# Patient Record
Sex: FEMALE | Race: WHITE | Employment: FULL TIME | URBAN - METROPOLITAN AREA
[De-identification: names, ages, dates, MRNs, and addresses within clinical notes are randomized per-mention and may not be internally consistent; named-entity substitution may affect disease eponyms.]

---

## 2017-09-26 ENCOUNTER — HOSPITAL ENCOUNTER (EMERGENCY)
Facility: CLINIC | Age: 57
Discharge: HOME | End: 2017-09-26
Attending: EMERGENCY MEDICINE | Admitting: EMERGENCY MEDICINE
Payer: COMMERCIAL

## 2017-09-26 VITALS
OXYGEN SATURATION: 98 % | SYSTOLIC BLOOD PRESSURE: 169 MMHG | DIASTOLIC BLOOD PRESSURE: 88 MMHG | HEART RATE: 97 BPM | TEMPERATURE: 97.5 F

## 2017-09-26 DIAGNOSIS — T78.40XA ALLERGIC REACTION, INITIAL ENCOUNTER: ICD-10-CM

## 2017-09-26 DIAGNOSIS — R03.0 ELEVATED BLOOD PRESSURE READING WITHOUT DIAGNOSIS OF HYPERTENSION: ICD-10-CM

## 2017-09-26 PROCEDURE — 99283 EMERGENCY DEPT VISIT LOW MDM: CPT | Performed by: EMERGENCY MEDICINE

## 2017-09-26 PROCEDURE — 99284 EMERGENCY DEPT VISIT MOD MDM: CPT | Mod: Z6 | Performed by: EMERGENCY MEDICINE

## 2017-09-26 ASSESSMENT — ENCOUNTER SYMPTOMS
DIZZINESS: 1
RESPIRATORY NEGATIVE: 1
LIGHT-HEADEDNESS: 1
HEMATOLOGIC/LYMPHATIC NEGATIVE: 1
ENDOCRINE NEGATIVE: 1
EYES NEGATIVE: 1
GASTROINTESTINAL NEGATIVE: 1
NERVOUS/ANXIOUS: 1
CARDIOVASCULAR NEGATIVE: 1
CONSTITUTIONAL NEGATIVE: 1
ALLERGIC/IMMUNOLOGIC NEGATIVE: 1
MUSCULOSKELETAL NEGATIVE: 1

## 2017-09-26 NOTE — ED PROVIDER NOTES
"  History     Chief Complaint   Patient presents with     Allergic Reaction     pt ate almonds 30 mins ago and started feeling shaky, roof of mouth \"felt funny and thick\"  light headed, weak and dizzy  pt took 50 mg benadryl with soom relief.     HPI  Joselyn James is a 56 year old female With a known history of allergies to almonds who presents for concern that she is having an allergic reaction.  Patient ate almonds about 30 minutes prior to ED arrival and began feeling shaky.  She reports the roof of her mouth feels thick and funny and she reports feeling lightheaded weak and dizzy.  She took some Benadryl before coming into the ED. patient reports every time she eats almonds she thinks she might be allergic to this.  She ate 10 almonds prior to coming to the emergency department.  She had a sensation that the roof of her mouth was swelling.  She also felt weak and dizzy.  She was concerned she was having an allergic reaction and elected to come to the emergency department for further care.  She did not have any rash or wheezing she has no swelling of her tongue or facial swelling.  She does not have an EpiPen at home.  She is on sulfasalazine for Crohn's disease and takes omeprazole.  She reports no allergies to medications.  She is yet to be tested for allergies to almonds.    Social history: Lives in [place], [place]. Here in ED by private car with spouse.    Past medical history: Crohn's disease, GERD    Medications:  No current facility-administered medications for this encounter.      Current Outpatient Prescriptions   Medication     sulfaSALAzine (AZULFIDINE) 500 MG tablet     tretinoin (RETIN-A) 0.025 % cream     hydroquinone 4 % CREA     clindamycin-benzoyl peroxide (BENZACLIN) gel     Allergies:     Allergies   Allergen Reactions     Ceftazidime Anaphylaxis     Penicillins Diarrhea     Emesis and difficulty breathing, HIVES     I have reviewed the Medications, Allergies, Past Medical and Surgical " History, and Social History in the Epic system.         Review of Systems   Constitutional: Negative.    HENT:        Sensation of fullness in the throat   Eyes: Negative.    Respiratory: Negative.    Cardiovascular: Negative.    Gastrointestinal: Negative.    Endocrine: Negative.    Genitourinary: Negative.    Musculoskeletal: Negative.    Allergic/Immunologic: Negative.    Neurological: Positive for dizziness and light-headedness.   Hematological: Negative.    Psychiatric/Behavioral: The patient is nervous/anxious.        Physical Exam   BP: (!) 213/110  Pulse: 97  Temp: 97.5  F (36.4  C)  SpO2: 97 %  Physical Exam   Constitutional: She is oriented to person, place, and time. She appears well-developed and well-nourished. No distress.   HENT:   Head: Normocephalic and atraumatic.   Eyes: Conjunctivae and EOM are normal. Pupils are equal, round, and reactive to light. Right eye exhibits no discharge. Left eye exhibits no discharge. No scleral icterus.   Neck: Normal range of motion. Neck supple. No JVD present. No tracheal deviation present. No thyromegaly present.   Cardiovascular: Normal rate and regular rhythm.  Exam reveals no gallop and no friction rub.    No murmur heard.  Pulmonary/Chest: Effort normal and breath sounds normal. No stridor. No respiratory distress. She has no wheezes. She has no rales. She exhibits no tenderness.   Abdominal: Soft. Bowel sounds are normal. She exhibits no distension and no mass. There is no tenderness. There is no rebound and no guarding.   Lymphadenopathy:     She has no cervical adenopathy.   Neurological: She is alert and oriented to person, place, and time.   Skin: No rash noted. She is not diaphoretic. No erythema. No pallor.   Psychiatric: She has a normal mood and affect. Her behavior is normal. Judgment and thought content normal.       ED Course     ED Course     Procedures             Critical Care time:  none               Labs Ordered and Resulted from Time of ED  Arrival Up to the Time of Departure from the ED - No data to display  ED medications: none  .     ED labs and imaging: none    ED Vitals:  Vitals:    09/26/17 0945 09/26/17 1000 09/26/17 1015 09/26/17 1045   BP:  (!) 170/93     Pulse:       Temp:       TempSrc:       SpO2: 98% 98% 98% 98%       Assessments & Plan (with Medical Decision Making)   Clinical impression: Pleasant 56-year-old female with a history of Crohn's disease on sulfasalazine and omeprazole who presented for concern for an allergic reaction.  Her symptoms are not concerning for anaphylaxis or consistent with an allergic reaction, however an allergy to nuts or almonds cannot be excluded without additional testing.    She ate 10 almonds about half an hour prior to ED arrival and felt she had swelling in the back of her throat felt. She reports she felt weak and dizzy.  Patient reports she thinks she might be allergic to almonds.  She has not been tested for allergies to almonds or nuts.  No medication allergies.   She did not have any facial swelling, no tongue swelling, no trouble swallowing..  No wheezing or crackles.  No vomiting.  On my exam she is in no acute distress she is hypertensive.  She reports no history of hypertension.  Her lungs are clear to auscultation.  She has normal speech.  She has no rashes.       ED course and Plan:  She was observed in the emergency department she took oral Benadryl before coming to the ED.  We discussed treatment for allergic reaction and anaphylaxis.  Her presentation is not consistent with anaphylaxis.  Her symptoms are subjective except for hypertension.  We discussed concern for possible untreated hypertension.  We also discussed additional testing in allergy clinic given symptoms occurring after eating almonds.  Patient is discharged home after a period of observation for 2 hours without any progression of her symptoms- she did not develop any oral swelling or lip swelling or tongue swelling or facial  swelling.  She is counseled on untreated hypertension as her blood pressure was elevated during the course of care in the ED.  Blood pressure at time of discharge is 150/97.  I encouraged her to establish primary care.  She may return if she has worsening symptoms.  There is no indication for epinephrine treatment, all indication to send her home with an EpiPen.  She tells me she plans to avoid eating nuts from now on.      Disclaimer: This note consists of symbols derived from keyboarding, dictation and/or voice recognition software. As a result, there may be errors in the script that have gone undetected. Please consider this when interpreting information found in this chart.  I have reviewed the nursing notes.    I have reviewed the findings, diagnosis, plan and need for follow up with the patient.       New Prescriptions    No medications on file       Final diagnoses:   Allergic reaction, initial encounter - Report of sensation of throat tightness, weak and dizzy with increased heart rate, and blood pressure.  No objective sequela of a reaction.   Elevated blood pressure reading without diagnosis of hypertension - Suspicious for untreated hypertension       9/26/2017   [facility] EMERGENCY DEPARTMENT     Ahmet Vogt MD  09/26/17 6484

## 2017-09-26 NOTE — ED NOTES
Ate almonds at 0800 and within 10-15 min had tight throat,lips felt numb and swollen,shakey,weak and dizzy - took benadryl 50 mg with some relief - no problem breathing or swallowing

## 2017-09-26 NOTE — ED AVS SNAPSHOT
[facility] Emergency Department    [address] [facility] [address]    Phone:  [phone]    Fax:  [phone]                                       Joselyn James   MRN: [id number]    Department:  [facility] Emergency Department   Date of Visit:  9/26/2017           Patient Information     Date Of Birth          1960        Your diagnoses for this visit were:     Allergic reaction, initial encounter Report of sensation of throat tightness, weak and dizzy with increased heart rate, and blood pressure.  No objective sequela of a reaction.    Elevated blood pressure reading without diagnosis of hypertension Suspicious for untreated hypertension       You were seen by Ahmet Vogt MD.      Follow-up Information     Follow up with Nitish Vides MD. Call today.    Specialty:  Family Practice    Why:  To establish primary care or he may follow up with Dr. montano if you desire.  It is important to check up and follow-up on your blood pressure.    Contact information:    [facility]  [address]  [place] [address]  [phone]          Follow up with [facility].    Specialty:  Allergy    Why:  If you decide to keep testing for nuts to exclude an allergy    Contact information:    [address] [address] [facility] [address] [phone]    Additional information:    The medical center is located at   [address] [place] [address] (between [address] and   [address] in [place], [place] miles north   of [place]).        Discharge Instructions         General Allergic Reactions  An allergic reaction is a set of symptoms caused by an allergen. An allergen is something that causes a person s immune system to react. When a person comes in contact with an allergen, it causes the body to release chemicals. These include the chemical histamine. Histamine causes swelling and itching. It may affect the entire body. This is called a general allergic reaction. Often  symptoms affect only 1 part of the body. This is called a local allergic reaction.  You are having an allergic reaction. Almost anything can cause one. Different people are allergic to different things. It is usually something that you ate or swallowed, came into contact with by getting or putting it on your skin or clothes, or something you breathed in the air. This can be very annoying and sometimes scary.  Most of us think of allergic reactions when we have a rash or itchy skin. Symptoms can include:    Itching of the eyes, nose, and roof of the mouth    Runny or stuffy nose    Watery eyes     Sneezing or coughing     A blocked feeling in the ear    Red, itchy rash called hives    Red and purple spots    Rash, redness, welts, blisters    Itching, burning, stinging, pain    Dry, flaky, cracking, scaly skin  Severe symptoms include:    Swelling of the face, lips, or other parts of the body    Hoarse voice    Trouble swallowing, feeling like your throat is closing    Trouble breathing, wheezing    Nausea, vomiting, diarrhea, stomach cramps    Feeling faint or lightheaded, rapid heart rate  Sometimes the cause may be obvious. But there are so many things that can cause a reaction that you may not be able to figure out. The most important things to help find your allergen are:    Remembering when it started    What you were doing at the time or just before that    Any activities you were involved in    Any new products or contacts  Below are some common causes. But remember that almost anything can cause a reaction. You may not even be aware that you came into contact with one of these things:    Dust, mold, pollen    Plants (common ones are poison ivy and poison oak, but there are many others)     Animals    Foods such as shrimp, shellfish, peanuts, milk products, gluten, and eggs. Also food colorings, flavorings, and additives.    Insect bites or stings such as bees, mosquitos, fleas, ticks    Medicines such  as penicillin, sulfa medicines, amoxicillin, aspirin, and ibuprofen. But any medicine can cause a reaction.    Jewelry such as nickel or gold. This can be new, or something you ve worn for a while, including zippers and buttons.    Latex such as in gloves, clothes, toys, balloons, or some tapes. Some people allergic to latex may also have problems with foods like bananas, avocados, kiwi, papaya, or chestnuts.    Lotions, perfumes, cosmetics, soaps, shampoos, skincare products, nail products    Chemicals or dyes in clothing, linen, , hair dyes, soaps, iodine  Many viruses and common colds can cause a rash that is not an allergic reaction. Sometimes it is hard to tell the difference between allergies, sensitivity, or an intolerance to something. This is especially true with food. Many things can cause diarrhea, vomiting, stomach cramps, and skin irritation.  Home care    The goal of treatment is to help relieve the symptoms and get you feeling better. The rash will usually fade over several days. But it can sometimes last a couple of weeks. Over the next couple of days, there may be times when it is gets a little worse, and then better again. Here are some things to do:    If you know what you are allergic to, stay away from it. Future reactions could be worse than this one.    Avoid tight clothing and anything that heats up your skin (hot showers or baths, direct sunlight). Heat will make itching worse.    An ice pack will relieve local areas of intense itching and redness. To make an ice pack, put ice cubes in a plastic bag that seals at the top. Wrap it in a thin, clean towel. Don t put the ice directly on the skin because it can damage the skin.    Oral diphenhydramine is an over-the-counter antihistamine sold at pharmacy and grocery stores. Unless a prescription antihistamine was given, diphenhydramine may be used to reduce itching if large areas of the skin are involved. It may make you sleepy. So be  careful using it in the daytime or when going to school, working, or driving. Note: Don t use diphenhydramine if you have glaucoma or if you are a man with trouble urinating due to an enlarged prostate. There are other antihistamines that won t make you so sleepy. These are good choices for daytime use. Ask your pharmacist for suggestions.    Don t use diphenhydramine cream on your skin. It can cause a further reaction in some people.    To help prevent an infection, don't scratch the affected area. Scratching may worsen the reaction and damage your skin. It can also lead to an infection. Always check the affected for signs of an infection.    Call your healthcare provider and ask what you can use to help decrease the itching.    To decrease allergic reactions, try the following:      Use heat-steam to clean your home    Use high-efficiency particulate (HEPA) vacuums and filters    Stay away from food and pet triggers    Kill any cockroaches    Clean your house often  Follow-up care  Follow up with your healthcare provider, or as advised. If you had a severe reaction today, or if you have had several mild to medium allergic reactions in the past, ask your provider about allergy testing. This can help you find out what you are allergic to. If your reaction included dizziness, fainting, or trouble breathing or swallowing, ask your provider about carrying auto-injectable epinephrine.  Call 911  Call 911 if any of these occur:    Trouble breathing or swallowing, wheezing    Cool, moist, pale skin    Shortness of breath    Hoarse voice or trouble speaking    Confused     Very drowsy or trouble awakening    Fainting or loss of consciousness    Rapid heart rate    Feeling of dizziness or weakness or a sudden drop in blood pressure    Feeling of doom    Feeling lightheaded    Severe nausea or vomiting, or diarrhea    Seizure    Swelling in the face, eyelids, lips, mouth, throat or tongue    Drooling  When to seek medical  advice  Call your healthcare provider right away if any of these occur:    Spreading areas of itching, redness or swelling    Nausea or stomach cramps or abdominal pain    Continuing or recurring symptoms    Spreading areas of redness, swelling, or itching    Signs of infection at the affected site:    Spreading redness    Increased pain or swelling    Fluid or colored drainage from the site    Fever of 100.4 F (38 C) or above lasting for 24 to 48 hours, or as directed by your provider  Date Last Reviewed: 3/1/2017    9639-0141 [employer]. [address] [place], [place] [address]. All rights reserved. This information is not intended as a substitute for professional medical care. Always follow your healthcare professional's instructions.          Discharge References/Attachments     HYPERTENSION, TO BE CONFIRMED (ENGLISH)      24 Hour Appointment Hotline       To make an appointment at any [facility], call [phone] ([phone]). If you don't have a family doctor or clinic, we will help you find one. [facility] clinics are conveniently located to serve the needs of you and your family.             Review of your medicines      Our records show that you are taking the medicines listed below. If these are incorrect, please call your family doctor or clinic.        Dose / Directions Last dose taken    clindamycin-benzoyl peroxide gel   Commonly known as:  BENZACLIN   Quantity:  50 g        Apply daily in the morning to face.   Refills:  11        hydroquinone 4 % Crea   Quantity:  45 g        Apply daily to hyperpigmentation on face.   Refills:  4        sulfaSALAzine 500 MG tablet   Commonly known as:  AZULFIDINE   Dose:  1000 mg        Take 1,000 mg by mouth daily   Refills:  0        tretinoin 0.025 % cream   Commonly known as:  RETIN-A   Quantity:  45 g        Spread a pea size amount into affected area topically at bedtime.  Use sunscreen SPF>20.   Refills:  11                Orders  "Needing Specimen Collection     None      Pending Results     No orders found from 2017 to 2017.            Pending Culture Results     No orders found from 2017 to 2017.            Pending Results Instructions     If you had any lab results that were not finalized at the time of your Discharge, you can call the ED Lab Result RN at [phone]. You will be contacted by this team for any positive Lab results or changes in treatment. The nurses are available 7 days a week from 10A to 6:30P.  You can leave a message 24 hours per day and they will return your call.        Test Results From Your Hospital Stay               Thank you for choosing [facility]       Thank you for choosing [facility] for your care. Our goal is always to provide you with excellent care. Hearing back from our patients is one way we can continue to improve our services. Please take a few minutes to complete the written survey that you may receive in the mail after you visit with us. Thank you!        [facility] Information     [email or web address] lets you send messages to your doctor, view your test results, renew your prescriptions, schedule appointments and more. To sign up, go to [email or web address] . Click on \"Log in\" on the left side of the screen, which will take you to the Welcome page. Then click on \"Sign up Now\" on the right side of the page.     You will be asked to enter the access code listed below, as well as some personal information. Please follow the directions to create your username and password.     Your access code is: [email or web address]  Expires: 2017 11:00 AM     Your access code will  in 90 days. If you need help or a new code, please call your [facility] clinic or [phone].        Care EveryWhere ID     This is your Care EveryWhere ID. This could be used by other organizations to access your [facility] medical records  [id number]        Equal Access to Services     NANETTE KRUGER: Marianne tate " Samaria, yue thapa, kahdrafarida kadeepalimiguelina plata, aurelia gibbs. So [facility] [phone].    ATENCIÓN: Si habla español, tiene a price disposición servicios gratuitos de asistencia lingüística. Llame al [phone].    We comply with applicable federal civil rights laws and [place] laws. We do not discriminate on the basis of race, color, national origin, age, disability sex, sexual orientation or gender identity.            After Visit Summary       This is your record. Keep this with you and show to your community pharmacist(s) and doctor(s) at your next visit.

## 2017-09-26 NOTE — DISCHARGE INSTRUCTIONS
General Allergic Reactions  An allergic reaction is a set of symptoms caused by an allergen. An allergen is something that causes a person s immune system to react. When a person comes in contact with an allergen, it causes the body to release chemicals. These include the chemical histamine. Histamine causes swelling and itching. It may affect the entire body. This is called a general allergic reaction. Often symptoms affect only 1 part of the body. This is called a local allergic reaction.  You are having an allergic reaction. Almost anything can cause one. Different people are allergic to different things. It is usually something that you ate or swallowed, came into contact with by getting or putting it on your skin or clothes, or something you breathed in the air. This can be very annoying and sometimes scary.  Most of us think of allergic reactions when we have a rash or itchy skin. Symptoms can include:    Itching of the eyes, nose, and roof of the mouth    Runny or stuffy nose    Watery eyes     Sneezing or coughing     A blocked feeling in the ear    Red, itchy rash called hives    Red and purple spots    Rash, redness, welts, blisters    Itching, burning, stinging, pain    Dry, flaky, cracking, scaly skin  Severe symptoms include:    Swelling of the face, lips, or other parts of the body    Hoarse voice    Trouble swallowing, feeling like your throat is closing    Trouble breathing, wheezing    Nausea, vomiting, diarrhea, stomach cramps    Feeling faint or lightheaded, rapid heart rate  Sometimes the cause may be obvious. But there are so many things that can cause a reaction that you may not be able to figure out. The most important things to help find your allergen are:    Remembering when it started    What you were doing at the time or just before that    Any activities you were involved in    Any new products or contacts  Below are some common causes. But remember that almost anything can cause a  reaction. You may not even be aware that you came into contact with one of these things:    Dust, mold, pollen    Plants (common ones are poison ivy and poison oak, but there are many others)     Animals    Foods such as shrimp, shellfish, peanuts, milk products, gluten, and eggs. Also food colorings, flavorings, and additives.    Insect bites or stings such as bees, mosquitos, fleas, ticks    Medicines such as penicillin, sulfa medicines, amoxicillin, aspirin, and ibuprofen. But any medicine can cause a reaction.    Jewelry such as nickel or gold. This can be new, or something you ve worn for a while, including zippers and buttons.    Latex such as in gloves, clothes, toys, balloons, or some tapes. Some people allergic to latex may also have problems with foods like bananas, avocados, kiwi, papaya, or chestnuts.    Lotions, perfumes, cosmetics, soaps, shampoos, skincare products, nail products    Chemicals or dyes in clothing, linen, , hair dyes, soaps, iodine  Many viruses and common colds can cause a rash that is not an allergic reaction. Sometimes it is hard to tell the difference between allergies, sensitivity, or an intolerance to something. This is especially true with food. Many things can cause diarrhea, vomiting, stomach cramps, and skin irritation.  Home care    The goal of treatment is to help relieve the symptoms and get you feeling better. The rash will usually fade over several days. But it can sometimes last a couple of weeks. Over the next couple of days, there may be times when it is gets a little worse, and then better again. Here are some things to do:    If you know what you are allergic to, stay away from it. Future reactions could be worse than this one.    Avoid tight clothing and anything that heats up your skin (hot showers or baths, direct sunlight). Heat will make itching worse.    An ice pack will relieve local areas of intense itching and redness. To make an ice pack, put ice  cubes in a plastic bag that seals at the top. Wrap it in a thin, clean towel. Don t put the ice directly on the skin because it can damage the skin.    Oral diphenhydramine is an over-the-counter antihistamine sold at pharmacy and grocery stores. Unless a prescription antihistamine was given, diphenhydramine may be used to reduce itching if large areas of the skin are involved. It may make you sleepy. So be careful using it in the daytime or when going to school, working, or driving. Note: Don t use diphenhydramine if you have glaucoma or if you are a man with trouble urinating due to an enlarged prostate. There are other antihistamines that won t make you so sleepy. These are good choices for daytime use. Ask your pharmacist for suggestions.    Don t use diphenhydramine cream on your skin. It can cause a further reaction in some people.    To help prevent an infection, don't scratch the affected area. Scratching may worsen the reaction and damage your skin. It can also lead to an infection. Always check the affected for signs of an infection.    Call your healthcare provider and ask what you can use to help decrease the itching.    To decrease allergic reactions, try the following:      Use heat-steam to clean your home    Use high-efficiency particulate (HEPA) vacuums and filters    Stay away from food and pet triggers    Kill any cockroaches    Clean your house often  Follow-up care  Follow up with your healthcare provider, or as advised. If you had a severe reaction today, or if you have had several mild to medium allergic reactions in the past, ask your provider about allergy testing. This can help you find out what you are allergic to. If your reaction included dizziness, fainting, or trouble breathing or swallowing, ask your provider about carrying auto-injectable epinephrine.  Call 911  Call 911 if any of these occur:    Trouble breathing or swallowing, wheezing    Cool, moist, pale skin    Shortness of  breath    Hoarse voice or trouble speaking    Confused     Very drowsy or trouble awakening    Fainting or loss of consciousness    Rapid heart rate    Feeling of dizziness or weakness or a sudden drop in blood pressure    Feeling of doom    Feeling lightheaded    Severe nausea or vomiting, or diarrhea    Seizure    Swelling in the face, eyelids, lips, mouth, throat or tongue    Drooling  When to seek medical advice  Call your healthcare provider right away if any of these occur:    Spreading areas of itching, redness or swelling    Nausea or stomach cramps or abdominal pain    Continuing or recurring symptoms    Spreading areas of redness, swelling, or itching    Signs of infection at the affected site:    Spreading redness    Increased pain or swelling    Fluid or colored drainage from the site    Fever of 100.4 F (38 C) or above lasting for 24 to 48 hours, or as directed by your provider  Date Last Reviewed: 3/1/2017    3180-7590 [employer]. [address] [place] [address]. All rights reserved. This information is not intended as a substitute for professional medical care. Always follow your healthcare professional's instructions.

## 2017-09-26 NOTE — ED AVS SNAPSHOT
[facility]    [address] [address]    Phone:  [phone]    Fax:  [phone]                                       Joselyn James   MRN: [id number]    Department:  [facility]   Date of Visit:  9/26/2017           After Visit Summary Signature Page     I have received my discharge instructions, and my questions have been answered. I have discussed any challenges I see with this plan with the nurse or doctor.    ..........................................................................................................................................  Patient/Patient Representative Signature      ..........................................................................................................................................  Patient Representative Print Name and Relationship to Patient    ..................................................               ................................................  Date                                            Time    ..........................................................................................................................................  Reviewed by Signature/Title    ...................................................              ..............................................  Date                                                            Time

## 2017-12-27 ENCOUNTER — HOSPITAL ENCOUNTER (EMERGENCY)
Facility: CLINIC | Age: 57
Discharge: HOME | End: 2017-12-27
Attending: EMERGENCY MEDICINE | Admitting: EMERGENCY MEDICINE
Payer: COMMERCIAL

## 2017-12-27 VITALS
OXYGEN SATURATION: 98 % | SYSTOLIC BLOOD PRESSURE: 180 MMHG | HEART RATE: 94 BPM | HEIGHT: 65 IN | TEMPERATURE: 97.6 F | BODY MASS INDEX: 22.49 KG/M2 | DIASTOLIC BLOOD PRESSURE: 104 MMHG | WEIGHT: 135 LBS | RESPIRATION RATE: 16 BRPM

## 2017-12-27 DIAGNOSIS — I10 HYPERTENSION, UNSPECIFIED TYPE: ICD-10-CM

## 2017-12-27 DIAGNOSIS — T78.40XA ALLERGIC REACTION, INITIAL ENCOUNTER: ICD-10-CM

## 2017-12-27 PROCEDURE — 99282 EMERGENCY DEPT VISIT SF MDM: CPT | Performed by: EMERGENCY MEDICINE

## 2017-12-27 PROCEDURE — 99284 EMERGENCY DEPT VISIT MOD MDM: CPT | Mod: Z6 | Performed by: EMERGENCY MEDICINE

## 2017-12-27 NOTE — ED PROVIDER NOTES
History     Chief Complaint   Patient presents with     Allergic Reaction     states was eating some chocolate and feels as though it might have been cross contaminated - states took 3 benadryl 45 min ago - now with high blood pressure that she is concerned about - states feels better although is concerned about bp     HPI  Joselyn James is a 57 year old female who has a history of Crohn's disease, squamous cell carcinoma of skin, hyperlipidemia, peptic ulcer, and GERD who presents to the ED for evaluation of an allergic reaction. Patient states that she has been eating clean and organic foods for many months in an effort to reduce her allergic reactions. Patient reports many allergic reactions in which she will experience a hot flash, increased heart rate, throat tightness, high blood pressure, and occasional hives. These reactions seemed to be well under control since changing her diet. Patient decided to have piece of chocolate today at 15:00, and shortly after this she started to experience all the symptoms of her previous reactions except for hives. Patient took 3 benadryl to alleviate her symptoms, but she continued to feel like she was going to pass out. Patient also continued to have high blood pressure at home which she reports she monitors closely. Patient reports nausea as well.   Patient has a history of hypertension at recent appointments; the most recent episode was in October at which time her blood pressure was 168/92. Patient had normal thyroid tests on 10/23.     Problem List:    There are no active problems to display for this patient.       Past Medical History:    Past Medical History:   Diagnosis Date     Skin cancer        Past Surgical History:    No past surgical history on file.    Family History:    No family history on file.    Social History:  Marital Status:   [2]  Social History   Substance Use Topics     Smoking status: Not on file     Smokeless tobacco: Not on file      "Alcohol use Not on file        Medications:      sulfaSALAzine (AZULFIDINE) 500 MG tablet   tretinoin (RETIN-A) 0.025 % cream   hydroquinone 4 % CREA   clindamycin-benzoyl peroxide (BENZACLIN) gel         Review of Systems  All other systems are reviewed and are negative.    Physical Exam   BP: (!) 195/105  Pulse: 94  Temp: 97.6  F (36.4  C)  Resp: 16  Height: 165.1 cm (5' 5\")  Weight: 61.2 kg (135 lb)  SpO2: 98 %      Physical Exam  Nontoxic appearing no respiratory distress alert and oriented ×3  Head atraumatic normocephalic  Conjunctiva noninjected, oropharynx moist without lesions or erythema  No cervical adenopathy neck supple full active painless range of motion  Lungs clear to auscultation  Heart regular no murmur  Abdomen soft nontender bowel sounds positive no masses or HSM  Strength and sensation grossly intact throughout the extremities, gait and station normal  Speech is fluent, good eye contact, thought processes are rational    ED Course     ED Course     Procedures               Critical Care time:  none               Labs Ordered and Resulted from Time of ED Arrival Up to the Time of Departure from the ED - No data to display  No results found for this or any previous visit (from the past 24 hour(s)).    Medications - No data to display    4:11 PM Patient assessed.    Assessments & Plan (with Medical Decision Making)  57-year-old female presents with \"allergic reaction\".  See HPI for details, pertinent information includes facial flushing, palpitations and tachycardia, elevated blood pressure.  She does at times get hives.  Question if these are actually allergic reactions.  There was no objective evidence for allergy during this visit, and in review of the last visit for allergic reaction again no objective findings to support same.  Question possible pheochromocytoma given flushing, palpitations and hypertension.  Ordered 24-hour urine collection for catecholamine.  Recommend follow-up with " primary care regarding results and further management.  Return criteria reviewed     I have reviewed the nursing notes.    I have reviewed the findings, diagnosis, plan and need for follow up with the patient.       Discharge Medication List as of 12/27/2017  4:47 PM          Final diagnoses:   Hypertension, unspecified type   Allergic reaction, initial encounter     This document serves as a record of the services and decisions personally performed and made by Adis Roe MD. It was created on HIS/HER behalf by   Margarita Poon, a trained medical scribe. The creation of this document is based the provider's statements to the medical scribe.  Margarita Poon 4:11 PM 12/27/2017    Provider:   The information in this document, created by the medical scribe for me, accurately reflects the services I personally performed and the decisions made by me. I have reviewed and approved this document for accuracy prior to leaving the patient care area.  Adis Roe MD 4:11 PM 12/27/2017 12/27/2017   [facility] EMERGENCY DEPARTMENT     Adis Roe MD  12/28/17 162

## 2017-12-27 NOTE — DISCHARGE INSTRUCTIONS
High Blood Pressure, To Be Confirmed, No Treatment  Your blood pressure today was higher than normal. Sometimes anxiety or pain can cause a temporary rise in blood pressure. It later returns to normal. Blood pressure that is high only one time doesn t mean that you have high blood pressure (hypertension). High blood pressure is a chronic illness. But you should have your blood pressure measured again within the next few days to find out if it s still high.    A blood pressure reading is made up of two numbers: a higher number over a lower number. The top number is the systolic pressure. The bottom number is the diastolic pressure. A normal blood pressure is a systolic pressure of less than 120 over a diastolic pressure of less than 80. You will see your blood pressure readings written together. For example, a person with a systolic pressure of 118 and a diastolic pressure of 78 will have 118/78 written in the medical record.     High blood pressure is when either the top number is 140 or higher, or the bottom number is 90 or higher. This must be the result when taking your blood pressure a number of times.   The blood pressures between normal and high are called prehypertension. This is systolic pressure of 120 to 140 or diastolic pressure of 80 to 89. Prehypertension means you are at risk of getting high blood pressure. It's a warning sign. The information gives you a chance to  make lifestyle changes such as weight loss, exercise, and quitting smoking, that can keep your blood pressure from going higher. You should have your blood pressure checked regularly to be sure it isn t rising.  Home care  To track your blood pressure, your provider may ask you to come into the office at different times and on different days. If your healthcare provider asks you to check your readings at home, ask him or her what times of the day to test and for how many days. Before you leave the office, ask your provider to show you how  to take your blood pressure and be sure to ask questions if you don't understand something.  Consider buying an automatic blood pressure monitor. Ask your provider for a recommendation. You can buy blood pressure monitors at most pharmacies.  The American Heart Association recommends the following guidelines for home blood pressure monitoring:    Don't smoke or drink coffee for 30 minutes before taking your blood pressure.    Go to the bathroom before the test.    Relax for 5 minutes before taking the measurement.    Sit with your back supported (don't sit on a couch or soft chair); keep your feet on the floor uncrossed. Place your arm on a solid flat surface (like a table) with the upper part of the arm at heart level. Place the middle of the cuff directly above the eye of the elbow. Check the monitor's instruction manual for an illustration.    Take multiple readings. When you measure, take 2 to 3 readings one minute apart and record all of the results.    Take your blood pressure at the same time every day, or as your healthcare provider recommends.    Record the date, time, and blood pressure reading.    Take the record with you to your next medical appointment. If your blood pressure monitor has a built-in memory, simply take the monitor with you to your next appointment.    Call your provider if you have several high readings. Don't be frightened by a single high blood pressure reading, but if you get several high readings, check in with your healthcare provider.    Note: When blood pressure reaches a systolic (top number) of 180 or higher OR diastolic (bottom number) of 110 or higher, seek emergency medical treatment.  Follow-up care  Keep all of your follow up appointments. If your blood pressure is high (more than 120 over 80) on 2 out of 3 days, you will need to follow up with your healthcare provider for more evaluation and treatment.  Don t put this off! High blood pressure can be treated. High blood  pressure that s not treated raises your risk for heart attack and stroke.  When to seek medical advice  Call your healthcare provider right away if any of these occur:    Blood pressure reaches a systolic (top number) of 180 or higher, OR diastolic (bottom number) of 110 or higher    Chest pain or shortness of breath    Severe headache    Throbbing or rushing sound in the ears    Nosebleed    Sudden severe pain in your belly (abdomen)    Extreme drowsiness, confusion, or fainting    Dizziness or dizziness with spinning sensation (vertigo)    Weakness of an arm or leg or one side of the face    You have problems speaking or seeing   Date Last Reviewed: 12/1/2016 2000-2017 [employer]. [address] [place] [address], [place] [address]. All rights reserved. This information is not intended as a substitute for professional medical care. Always follow your healthcare professional's instructions.      24 hour urine collection as directed, follow-up with primary care as soon as able.  Return here as noted above.  She need to follow blood pressures at home.

## 2017-12-27 NOTE — ED AVS SNAPSHOT
[facility]    [address] [address]    Phone:  [phone]    Fax:  [phone]                                       Joselyn James   MRN: [id number]    Department:  [facility]   Date of Visit:  12/27/2017           After Visit Summary Signature Page     I have received my discharge instructions, and my questions have been answered. I have discussed any challenges I see with this plan with the nurse or doctor.    ..........................................................................................................................................  Patient/Patient Representative Signature      ..........................................................................................................................................  Patient Representative Print Name and Relationship to Patient    ..................................................               ................................................  Date                                            Time    ..........................................................................................................................................  Reviewed by Signature/Title    ...................................................              ..............................................  Date                                                            Time

## 2017-12-27 NOTE — ED NOTES
"Pt has allergy to nuts among other things.  Pt ate chocolate and started to feel an adrenaline rush, ears rang, felt shaky like she was going to pass out, took 75mg benadryl and BP was elevated.  Pt denies any hives and any difficulty swallowing.  Pt currently feels lightheaded and \"out of sort.\"   at bedside.    "

## 2017-12-27 NOTE — ED AVS SNAPSHOT
[facility] Emergency Department    [address] [facility] [address]    Phone:  [phone]    Fax:  [phone]                                       Joselyn James   MRN: [id number]    Department:  [facility] Emergency Department   Date of Visit:  12/27/2017           Patient Information     Date Of Birth          1960        Your diagnoses for this visit were:     Hypertension, unspecified type     Allergic reaction, initial encounter        You were seen by Adis Roe MD.        Discharge Instructions         High Blood Pressure, To Be Confirmed, No Treatment  Your blood pressure today was higher than normal. Sometimes anxiety or pain can cause a temporary rise in blood pressure. It later returns to normal. Blood pressure that is high only one time doesn t mean that you have high blood pressure (hypertension). High blood pressure is a chronic illness. But you should have your blood pressure measured again within the next few days to find out if it s still high.    A blood pressure reading is made up of two numbers: a higher number over a lower number. The top number is the systolic pressure. The bottom number is the diastolic pressure. A normal blood pressure is a systolic pressure of less than 120 over a diastolic pressure of less than 80. You will see your blood pressure readings written together. For example, a person with a systolic pressure of 118 and a diastolic pressure of 78 will have 118/78 written in the medical record.     High blood pressure is when either the top number is 140 or higher, or the bottom number is 90 or higher. This must be the result when taking your blood pressure a number of times.   The blood pressures between normal and high are called prehypertension. This is systolic pressure of 120 to 140 or diastolic pressure of 80 to 89. Prehypertension means you are at risk of getting high blood pressure. It's a warning sign. The information gives you a  chance to  make lifestyle changes such as weight loss, exercise, and quitting smoking, that can keep your blood pressure from going higher. You should have your blood pressure checked regularly to be sure it isn t rising.  Home care  To track your blood pressure, your provider may ask you to come into the office at different times and on different days. If your healthcare provider asks you to check your readings at home, ask him or her what times of the day to test and for how many days. Before you leave the office, ask your provider to show you how to take your blood pressure and be sure to ask questions if you don't understand something.  Consider buying an automatic blood pressure monitor. Ask your provider for a recommendation. You can buy blood pressure monitors at most pharmacies.  The American Heart Association recommends the following guidelines for home blood pressure monitoring:    Don't smoke or drink coffee for 30 minutes before taking your blood pressure.    Go to the bathroom before the test.    Relax for 5 minutes before taking the measurement.    Sit with your back supported (don't sit on a couch or soft chair); keep your feet on the floor uncrossed. Place your arm on a solid flat surface (like a table) with the upper part of the arm at heart level. Place the middle of the cuff directly above the eye of the elbow. Check the monitor's instruction manual for an illustration.    Take multiple readings. When you measure, take 2 to 3 readings one minute apart and record all of the results.    Take your blood pressure at the same time every day, or as your healthcare provider recommends.    Record the date, time, and blood pressure reading.    Take the record with you to your next medical appointment. If your blood pressure monitor has a built-in memory, simply take the monitor with you to your next appointment.    Call your provider if you have several high readings. Don't be frightened by a single high  blood pressure reading, but if you get several high readings, check in with your healthcare provider.    Note: When blood pressure reaches a systolic (top number) of 180 or higher OR diastolic (bottom number) of 110 or higher, seek emergency medical treatment.  Follow-up care  Keep all of your follow up appointments. If your blood pressure is high (more than 120 over 80) on 2 out of 3 days, you will need to follow up with your healthcare provider for more evaluation and treatment.  Don t put this off! High blood pressure can be treated. High blood pressure that s not treated raises your risk for heart attack and stroke.  When to seek medical advice  Call your healthcare provider right away if any of these occur:    Blood pressure reaches a systolic (top number) of 180 or higher, OR diastolic (bottom number) of 110 or higher    Chest pain or shortness of breath    Severe headache    Throbbing or rushing sound in the ears    Nosebleed    Sudden severe pain in your belly (abdomen)    Extreme drowsiness, confusion, or fainting    Dizziness or dizziness with spinning sensation (vertigo)    Weakness of an arm or leg or one side of the face    You have problems speaking or seeing   Date Last Reviewed: 12/1/2016 2000-2017 [employer]. [address] [place] [address]. All rights reserved. This information is not intended as a substitute for professional medical care. Always follow your healthcare professional's instructions.      24 hour urine collection as directed, follow-up with primary care as soon as able.  Return here as noted above.  She need to follow blood pressures at home.    24 Hour Appointment Hotline       To make an appointment at any [facility] clinic, call [phone] ([phone]). If you don't have a family doctor or clinic, we will help you find one. [facility] clinics are conveniently located to serve the needs of you and your family.          ED Discharge Orders      Catecholamines fractioned free urine                    Review of your medicines      Our records show that you are taking the medicines listed below. If these are incorrect, please call your family doctor or clinic.        Dose / Directions Last dose taken    clindamycin-benzoyl peroxide gel   Commonly known as:  BENZACLIN   Quantity:  50 g        Apply daily in the morning to face.   Refills:  11        hydroquinone 4 % Crea   Quantity:  45 g        Apply daily to hyperpigmentation on face.   Refills:  4        sulfaSALAzine 500 MG tablet   Commonly known as:  AZULFIDINE   Dose:  1000 mg        Take 1,000 mg by mouth daily   Refills:  0        tretinoin 0.025 % cream   Commonly known as:  RETIN-A   Quantity:  45 g        Spread a pea size amount into affected area topically at bedtime.  Use sunscreen SPF>20.   Refills:  11                Orders Needing Specimen Collection     None      Pending Results     No orders found from 12/25/2017 to 12/28/2017.            Pending Culture Results     No orders found from 12/25/2017 to 12/28/2017.            Pending Results Instructions     If you had any lab results that were not finalized at the time of your Discharge, you can call the ED Lab Result RN at [phone]. You will be contacted by this team for any positive Lab results or changes in treatment. The nurses are available 7 days a week from 10A to 6:30P.  You can leave a message 24 hours per day and they will return your call.        Test Results From Your Hospital Stay               Thank you for choosing [facility]       Thank you for choosing [facility] for your care. Our goal is always to provide you with excellent care. Hearing back from our patients is one way we can continue to improve our services. Please take a few minutes to complete the written survey that you may receive in the mail after you visit with us. Thank you!        [employer] Information     [employer] lets you send messages to your doctor, view your  "test results, renew your prescriptions, schedule appointments and more. To sign up, go to [email or web address] . Click on \"Log in\" on the left side of the screen, which will take you to the Welcome page. Then click on \"Sign up Now\" on the right side of the page.     You will be asked to enter the access code listed below, as well as some personal information. Please follow the directions to create your username and password.     Your access code is: [email or web address]  Expires: 3/27/2018  4:38 PM     Your access code will  in 90 days. If you need help or a new code, please call your [facility] clinic or [phone].        Care EveryWhere ID     This is your Care EveryWhere ID. This could be used by other organizations to access your [facility] medical records  [id number]        Equal Access to Services     NANETTE ESTRELLA : Hadhenrietta Mera, uye thapa, mary pickeringalmamiguelina plata, aurelia carlton . So [facility] [phone].    ATENCIÓN: Si habla español, tiene a price disposición servicios gratuitos de asistencia lingüística. Llame al [phone].    We comply with applicable federal civil rights laws and [place] laws. We do not discriminate on the basis of race, color, national origin, age, disability, sex, sexual orientation, or gender identity.            After Visit Summary       This is your record. Keep this with you and show to your community pharmacist(s) and doctor(s) at your next visit.                  "

## 2017-12-29 DIAGNOSIS — I10 HYPERTENSION, UNSPECIFIED TYPE: ICD-10-CM

## 2017-12-29 PROCEDURE — 99000 SPECIMEN HANDLING OFFICE-LAB: CPT | Performed by: EMERGENCY MEDICINE

## 2017-12-29 PROCEDURE — 82384 ASSAY THREE CATECHOLAMINES: CPT | Mod: 90 | Performed by: EMERGENCY MEDICINE

## 2018-01-02 ENCOUNTER — OFFICE VISIT - HEALTHEAST (OUTPATIENT)
Dept: FAMILY MEDICINE | Facility: CLINIC | Age: 58
End: 2018-01-02

## 2018-01-02 ENCOUNTER — HOSPITAL ENCOUNTER (OUTPATIENT)
Dept: CT IMAGING | Facility: HOSPITAL | Age: 58
Discharge: HOME | End: 2018-01-02
Attending: FAMILY MEDICINE

## 2018-01-02 DIAGNOSIS — K50.90 CROHN'S DISEASE (H): ICD-10-CM

## 2018-01-02 DIAGNOSIS — C44.92 SQUAMOUS CELL CARCINOMA OF SKIN: ICD-10-CM

## 2018-01-02 DIAGNOSIS — R23.2 FLUSHING: ICD-10-CM

## 2018-01-02 DIAGNOSIS — C44.90 SKIN CANCER: ICD-10-CM

## 2018-01-02 DIAGNOSIS — K27.9 PEPTIC ULCER: ICD-10-CM

## 2018-01-02 ASSESSMENT — MIFFLIN-ST. JEOR: SCORE: 1162.72

## 2018-01-03 ENCOUNTER — RECORDS - HEALTHEAST (OUTPATIENT)
Dept: ADMINISTRATIVE | Facility: OTHER | Age: 58
End: 2018-01-03

## 2018-01-03 ENCOUNTER — COMMUNICATION - HEALTHEAST (OUTPATIENT)
Dept: FAMILY MEDICINE | Facility: CLINIC | Age: 58
End: 2018-01-03

## 2018-01-03 ENCOUNTER — AMBULATORY - HEALTHEAST (OUTPATIENT)
Dept: FAMILY MEDICINE | Facility: CLINIC | Age: 58
End: 2018-01-03

## 2018-01-03 DIAGNOSIS — K86.89 PANCREATIC MASS: ICD-10-CM

## 2018-01-03 DIAGNOSIS — K50.90 CROHN'S DISEASE (H): ICD-10-CM

## 2018-01-04 ENCOUNTER — COMMUNICATION - HEALTHEAST (OUTPATIENT)
Dept: FAMILY MEDICINE | Facility: CLINIC | Age: 58
End: 2018-01-04

## 2018-01-05 ENCOUNTER — HOSPITAL ENCOUNTER (OUTPATIENT)
Dept: MRI IMAGING | Facility: CLINIC | Age: 58
Discharge: HOME | End: 2018-01-05
Attending: FAMILY MEDICINE

## 2018-01-05 DIAGNOSIS — K86.89 PANCREATIC MASS: ICD-10-CM

## 2018-01-05 DIAGNOSIS — K86.9 DISEASE OF PANCREAS, UNSPECIFIED: ICD-10-CM

## 2018-01-10 ENCOUNTER — RECORDS - HEALTHEAST (OUTPATIENT)
Dept: ADMINISTRATIVE | Facility: OTHER | Age: 58
End: 2018-01-10

## 2018-01-10 ENCOUNTER — COMMUNICATION - HEALTHEAST (OUTPATIENT)
Dept: FAMILY MEDICINE | Facility: CLINIC | Age: 58
End: 2018-01-10

## 2018-01-11 ENCOUNTER — TELEPHONE (OUTPATIENT)
Dept: EMERGENCY MEDICINE | Facility: CLINIC | Age: 58
End: 2018-01-11

## 2018-01-11 LAB
CATECHOLS UR-IMP: ABNORMAL
COLLECT DURATION TIME UR: 24 HR
CREAT 24H UR-MRATE: 992 MG/D (ref 500–1400)
CREAT UR-MCNC: 32 MG/DL
DOPAMINE 24H UR-MRATE: 192 UG/D (ref 77–324)
DOPAMINE UR-MCNC: 5 UG/L
DOPAMINE/CREAT UR: 194 UG/G CRT (ref 0–250)
EPINEPH 24H UR-MRATE: 16 UG/D (ref 1–7)
EPINEPH UR-MCNC: 62 UG/L
EPINEPH/CREAT UR: 16 UG/G CRT (ref 0–20)
NOREPINEPH 24H UR-MRATE: 53 UG/D (ref 16–71)
NOREPINEPH UR-MCNC: 17 UG/L
NOREPINEPH/CREAT UR: 53 UG/G CRT (ref 0–45)
SPECIMEN VOL ?TM UR: 3100 ML

## 2018-01-11 NOTE — TELEPHONE ENCOUNTER
[facility] Emergency Department Lab result notification:    [facility] ED lab result protocol used  [facility] labs    Reason for call  Notify of lab results, assess symptoms,  review ED providers recommendations/discharge instructions (if necessary) and advise per ED lab result f/u protocol    Lab Result  Catecholamines fractioned free urine-24 hour  Abnormal results to be called to patient, to be given to her PCP.   Information table from ED Provider visit on 12/27/18  Symptoms reported at ED visit (Chief complaint, HPI) Chief Complaint   Patient presents with     Allergic Reaction       states was eating some chocolate and feels as though it might have been cross contaminated - states took 3 benadryl 45 min ago - now with high blood pressure that she is concerned about - states feels better although is concerned about bp      HPI  Joselyn James is a 57 year old female who has a history of Crohn's disease, squamous cell carcinoma of skin, hyperlipidemia, peptic ulcer, and GERD who presents to the ED for evaluation of an allergic reaction. Patient states that she has been eating clean and organic foods for many months in an effort to reduce her allergic reactions. Patient reports many allergic reactions in which she will experience a hot flash, increased heart rate, throat tightness, high blood pressure, and occasional hives. These reactions seemed to be well under control since changing her diet. Patient decided to have piece of chocolate today at 15:00, and shortly after this she started to experience all the symptoms of her previous reactions except for hives. Patient took 3 benadryl to alleviate her symptoms, but she continued to feel like she was going to pass out. Patient also continued to have high blood pressure at home which she reports she monitors closely. Patient reports nausea as well.   Patient has a history of hypertension at recent appointments; the most recent episode was in October at which time  her blood pressure was 168/92. Patient had normal thyroid tests on 10/23.      ED providers Impression and Plan (applicable information) Final diagnoses:   Hypertension, unspecified type   Allergic reaction, initial encounter      This document serves as a record of the services and decisions personally performed and made by Adis Roe MD. It was created on HIS/HER behalf by   Margarita Poon, a trained medical scribe. The creation of this document is based the provider's statements to the medical scribe.  Margarita Poon 4:11 PM 12/27/2017     Provider:   The information in this document, created by the medical scribe for me, accurately reflects the services I personally performed and the decisions made by me. I have reviewed and approved this document for accuracy prior to leaving the patient care area.  Adis Roe MD 4:11 PM 12/27/2017 12/27/2017   [facility] EMERGENCY DEPARTMENT     Adis Roe MD  12/28/17 8279     Miscellaneous information 24 hour urine sent to lab 12/29/17, completed 1/11/18     RN Assessment (Patient s current Symptoms), include time called.  [Insert Left message here if message left]  4:55 pm Message left to call us back at [phone], between 10 am and 6 pm, seven days a week. May leave a message 24/7, if no one available.     PCP follow-up Questions asked: NO    Odessa Cho RN  [facility] RN  Lung Nodule and ED Lab Result F/u RN  Epic pool (ED late result f/u RN): P [id number]  # [phone]    Copy of Lab result   Component Value Flag Ref Range Units Status Collected Lab   Catecholamines Duration Urine 24   hr Final 12/29/2017  7:00    Catecholamines Volume Urine 3100   mL Final 12/29/2017  7:00    Urine Dopamine/Creatinine 194  0 - 250 ug/g CRT Final 12/29/2017  7:00    Urine Dopamine 192  77 - 324 ug/d Final 12/29/2017  7:00    Comment:   (Note)   REFERENCE INTERVAL: Dopamine, Urine - ug/d   Access complete set of  age- and/or gender-specific   reference intervals for this test in the [facility]   Test Directory ([email or web address]).      Dopamine per Volume Urine 62   ug/L Final 12/29/2017  7:00    Urine Norepineph/Creatinine 53 (H) 0 - 45 ug/g CRT Final 12/29/2017  7:00    Urine Norepinephrine 53  16 - 71 ug/d Final 12/29/2017  7:00    Comment:   (Note)   REFERENCE INTERVAL: Norepinephrine, Urine - ug/d   Access complete set of age- and/or gender-specific   reference intervals for this test in the [facility]   Test Directory ([email or web address]).      Norepinephrine per Volume Urine 17   ug/L Final 12/29/2017  7:00    Urine Epinephrine/Creatinine 16  0 - 20 ug/g CRT Final 12/29/2017  7:00    Urine Epinephrine 16 (H) 1 - 7 ug/d Final 12/29/2017  7:00    Comment:   (Note)   REFERENCE INTERVAL: Epinephrine, Urine - ug/d   Access complete set of age- and/or gender-specific   reference intervals for this test in the [facility]   Test Directory ([email or web address]).      Epinephrine per Volume Urine 5   ug/L Final 12/29/2017  7:00    Catecholamines Fract Urine Free Interp SEE NOTE    Final 12/29/2017  7:00    Comment:   (Note)   TEST INFORMATION: Catecholamines Fractionated, Urine Free   The optimal specimen for this testing is a 24-hour urine   collection. Mass per day calculations are not reported for   patients younger than 4 years of age and for the following   specimen types: a random collection, a collection with   duration of less than 20 hours, a collection with duration   of greater than 28 hours, or a collection with total volume   less than 400 mL (if 18 years of age or older) or greater   than 5000 mL (all ages). Ratios to creatinine may be useful   for these evaluations.   Smaller increases in catecholamine concentrations (less   than two times the upper limit) usually are the result of   physiological stimuli, drugs, or improper specimen   collection. Significant elevation of  one or more   catecholamines (three or more times the upper reference   limit) is associated with an increased probability of a   neuroendocrine tumor.   Access complete set of age- and/or gender-specific   reference intervals for this test in the [facility]   Test Directory ([facility]).   Test developed and characteristics determined by [facility]. See Compliance Statement B: [employer]/CS      Catecholamines Fract Creat Random Urine Free 32   mg/dL Final 12/29/2017  7:00    Catecholamines Fract Creat Timed Urine Free 992  500 - 1400 mg/d Final 12/29/2017  7:00

## 2018-01-19 ENCOUNTER — OFFICE VISIT - HEALTHEAST (OUTPATIENT)
Dept: FAMILY MEDICINE | Facility: CLINIC | Age: 58
End: 2018-01-19

## 2018-01-19 DIAGNOSIS — Z01.818 PREOPERATIVE EXAMINATION: ICD-10-CM

## 2018-01-19 DIAGNOSIS — K86.2 PANCREATIC CYST: ICD-10-CM

## 2018-01-19 DIAGNOSIS — I10 HYPERTENSION: ICD-10-CM

## 2018-01-19 DIAGNOSIS — Z12.11 SCREEN FOR COLON CANCER: ICD-10-CM

## 2018-01-19 DIAGNOSIS — Z12.31 VISIT FOR SCREENING MAMMOGRAM: ICD-10-CM

## 2018-01-19 ASSESSMENT — MIFFLIN-ST. JEOR: SCORE: 1160.79

## 2018-01-23 ENCOUNTER — COMMUNICATION - HEALTHEAST (OUTPATIENT)
Dept: FAMILY MEDICINE | Facility: CLINIC | Age: 58
End: 2018-01-23

## 2018-01-24 ENCOUNTER — COMMUNICATION - HEALTHEAST (OUTPATIENT)
Dept: FAMILY MEDICINE | Facility: CLINIC | Age: 58
End: 2018-01-24

## 2018-01-25 ENCOUNTER — COMMUNICATION - HEALTHEAST (OUTPATIENT)
Dept: FAMILY MEDICINE | Facility: CLINIC | Age: 58
End: 2018-01-25

## 2018-01-25 ENCOUNTER — AMBULATORY - HEALTHEAST (OUTPATIENT)
Dept: NURSING | Facility: CLINIC | Age: 58
End: 2018-01-25

## 2018-01-25 DIAGNOSIS — Z01.818 PRE-OP EXAM: ICD-10-CM

## 2018-01-26 LAB
ATRIAL RATE - MUSE: 67 BPM
DIASTOLIC BLOOD PRESSURE - MUSE: NORMAL MMHG
INTERPRETATION ECG - MUSE: NORMAL
P AXIS - MUSE: 67 DEGREES
PR INTERVAL - MUSE: 150 MS
QRS DURATION - MUSE: 84 MS
QT - MUSE: 428 MS
QTC - MUSE: 452 MS
R AXIS - MUSE: 12 DEGREES
SYSTOLIC BLOOD PRESSURE - MUSE: NORMAL MMHG
T AXIS - MUSE: 31 DEGREES
VENTRICULAR RATE- MUSE: 67 BPM

## 2018-02-07 ASSESSMENT — MIFFLIN-ST. JEOR: SCORE: 1157.62

## 2018-02-08 ENCOUNTER — ANESTHESIA - HEALTHEAST (OUTPATIENT)
Dept: SURGERY | Facility: HOSPITAL | Age: 58
End: 2018-02-08

## 2018-02-09 ENCOUNTER — SURGERY - HEALTHEAST (OUTPATIENT)
Dept: SURGERY | Facility: HOSPITAL | Age: 58
End: 2018-02-09

## 2018-03-26 ENCOUNTER — COMMUNICATION - HEALTHEAST (OUTPATIENT)
Dept: FAMILY MEDICINE | Facility: CLINIC | Age: 58
End: 2018-03-26

## 2018-03-26 DIAGNOSIS — I10 HYPERTENSION: ICD-10-CM

## 2018-04-27 ENCOUNTER — RECORDS - HEALTHEAST (OUTPATIENT)
Dept: ADMINISTRATIVE | Facility: OTHER | Age: 58
End: 2018-04-27

## 2018-08-28 ENCOUNTER — OFFICE VISIT - HEALTHEAST (OUTPATIENT)
Dept: FAMILY MEDICINE | Facility: CLINIC | Age: 58
End: 2018-08-28

## 2018-08-28 DIAGNOSIS — K13.0 CONTACT CHEILITIS: ICD-10-CM

## 2018-08-28 ASSESSMENT — MIFFLIN-ST. JEOR: SCORE: 1175.76

## 2018-10-15 ENCOUNTER — COMMUNICATION - HEALTHEAST (OUTPATIENT)
Dept: FAMILY MEDICINE | Facility: CLINIC | Age: 58
End: 2018-10-15

## 2018-10-19 ENCOUNTER — RECORDS - HEALTHEAST (OUTPATIENT)
Dept: ADMINISTRATIVE | Facility: OTHER | Age: 58
End: 2018-10-19

## 2018-10-26 ENCOUNTER — HOSPITAL ENCOUNTER (OUTPATIENT)
Dept: MRI IMAGING | Facility: HOSPITAL | Age: 58
Discharge: HOME | End: 2018-10-26
Attending: INTERNAL MEDICINE

## 2018-10-26 DIAGNOSIS — K86.2 PANCREAS CYST: ICD-10-CM

## 2018-11-29 ENCOUNTER — OFFICE VISIT - HEALTHEAST (OUTPATIENT)
Dept: FAMILY MEDICINE | Facility: CLINIC | Age: 58
End: 2018-11-29

## 2018-11-29 DIAGNOSIS — K50.90 CROHN'S DISEASE WITHOUT COMPLICATION, UNSPECIFIED GASTROINTESTINAL TRACT LOCATION (H): ICD-10-CM

## 2018-11-29 DIAGNOSIS — C44.92 SQUAMOUS CELL CARCINOMA OF SKIN: ICD-10-CM

## 2018-11-29 DIAGNOSIS — Z12.11 SCREEN FOR COLON CANCER: ICD-10-CM

## 2018-11-29 DIAGNOSIS — I10 HYPERTENSION: ICD-10-CM

## 2018-11-29 DIAGNOSIS — Z00.00 ROUTINE GENERAL MEDICAL EXAMINATION AT A HEALTH CARE FACILITY: ICD-10-CM

## 2018-11-29 LAB
ALBUMIN SERPL-MCNC: 4.2 G/DL (ref 3.5–5)
ALP SERPL-CCNC: 59 U/L (ref 45–120)
ALT SERPL W P-5'-P-CCNC: 19 U/L (ref 0–45)
ANION GAP SERPL CALCULATED.3IONS-SCNC: 8 MMOL/L (ref 5–18)
AST SERPL W P-5'-P-CCNC: 23 U/L (ref 0–40)
BILIRUB DIRECT SERPL-MCNC: 0.2 MG/DL
BILIRUB SERPL-MCNC: 0.5 MG/DL (ref 0–1)
BUN SERPL-MCNC: 12 MG/DL (ref 8–22)
CALCIUM SERPL-MCNC: 9.6 MG/DL (ref 8.5–10.5)
CHLORIDE BLD-SCNC: 104 MMOL/L (ref 98–107)
CHOLEST SERPL-MCNC: 253 MG/DL
CO2 SERPL-SCNC: 31 MMOL/L (ref 22–31)
CREAT SERPL-MCNC: 0.74 MG/DL (ref 0.6–1.1)
ERYTHROCYTE [DISTWIDTH] IN BLOOD BY AUTOMATED COUNT: 10.9 % (ref 11–14.5)
FASTING STATUS PATIENT QL REPORTED: YES
GFR SERPL CREATININE-BSD FRML MDRD: >60 ML/MIN/1.73M2
GLUCOSE BLD-MCNC: 87 MG/DL (ref 70–125)
HCT VFR BLD AUTO: 39.1 % (ref 35–47)
HDLC SERPL-MCNC: 76 MG/DL
HGB BLD-MCNC: 13.3 G/DL (ref 12–16)
LDLC SERPL CALC-MCNC: 160 MG/DL
MCH RBC QN AUTO: 30.1 PG (ref 27–34)
MCHC RBC AUTO-ENTMCNC: 34 G/DL (ref 32–36)
MCV RBC AUTO: 89 FL (ref 80–100)
PLATELET # BLD AUTO: 176 THOU/UL (ref 140–440)
PMV BLD AUTO: 8.6 FL (ref 7–10)
POTASSIUM BLD-SCNC: 4 MMOL/L (ref 3.5–5)
PROT SERPL-MCNC: 6.8 G/DL (ref 6–8)
RBC # BLD AUTO: 4.41 MILL/UL (ref 3.8–5.4)
SODIUM SERPL-SCNC: 143 MMOL/L (ref 136–145)
TRIGL SERPL-MCNC: 84 MG/DL
WBC: 5 THOU/UL (ref 4–11)

## 2018-11-29 ASSESSMENT — MIFFLIN-ST. JEOR: SCORE: 1193.9

## 2019-01-07 RX ORDER — EPINEPHRINE 0.3 MG/.3ML
0.3 INJECTION SUBCUTANEOUS
COMMUNITY
Start: 2017-10-23

## 2019-01-07 RX ORDER — METOPROLOL TARTRATE 25 MG/1
12.5 TABLET, FILM COATED ORAL DAILY
COMMUNITY
End: 2022-01-14

## 2019-01-09 ENCOUNTER — ANESTHESIA EVENT (OUTPATIENT)
Dept: GASTROENTEROLOGY | Facility: CLINIC | Age: 59
End: 2019-01-09
Payer: COMMERCIAL

## 2019-01-09 NOTE — ANESTHESIA PREPROCEDURE EVALUATION
Anesthesia Pre-Procedure Evaluation    Patient: Joselyn James   MRN: [id number] : 1960          Preoperative Diagnosis: screening    Procedure(s):  COLONOSCOPY    Past Medical History:   Diagnosis Date     Skin cancer      No past surgical history on file.    Anesthesia Evaluation     . Pt has had prior anesthetic. Type: MAC and General    History of anesthetic complications   - PONV        ROS/MED HX    ENT/Pulmonary:  - neg pulmonary ROS     Neurologic:  - neg neurologic ROS     Cardiovascular:     (+) Dyslipidemia, hypertension----. : . . . :. .       METS/Exercise Tolerance:     Hematologic:  - neg hematologic  ROS       Musculoskeletal:  - neg musculoskeletal ROS       GI/Hepatic:     (+) GERD Asymptomatic on medication, Other GI/Hepatic crohns disease      Renal/Genitourinary:  - ROS Renal section negative       Endo:  - neg endo ROS       Psychiatric:  - neg psychiatric ROS       Infectious Disease:  - neg infectious disease ROS       Malignancy:      - no malignancy   Other:    - neg other ROS                      Physical Exam  Normal systems: cardiovascular, pulmonary and dental    Airway   Mallampati: II  TM distance: >3 FB  Neck ROM: full    Dental     Cardiovascular       Pulmonary             Lab Results   Component Value Date    WBC 6.3 2015    HGB 14.4 2015    HCT 42.9 2015     2015     2015    POTASSIUM 3.7 2015    CHLORIDE 105 2015    CO2 28 2015    BUN 15 2015    CR 0.81 2015    GLC 95 2015    RENA 9.1 2015    TSH 1.79 2015       Preop Vitals  BP Readings from Last 3 Encounters:   17 (!) 180/104   17 169/88   04/30/15 157/89    Pulse Readings from Last 3 Encounters:   17 94   17 97   04/30/15 79      Resp Readings from Last 3 Encounters:   17 16   04/30/15 16    SpO2 Readings from Last 3 Encounters:   17 98%   17 98%   04/30/15 99%      Temp Readings from  "Last 1 Encounters:   12/27/17 36.4  C (97.6  F) (Oral)    Ht Readings from Last 1 Encounters:   12/27/17 1.651 m (5' 5\")      Wt Readings from Last 1 Encounters:   12/27/17 61.2 kg (135 lb)    Estimated body mass index is 22.47 kg/m  as calculated from the following:    Height as of 12/27/17: 1.651 m (5' 5\").    Weight as of 12/27/17: 61.2 kg (135 lb).       Anesthesia Plan      History & Physical Review  History and physical reviewed and following examination; no interval change.    ASA Status:  2 .    NPO Status:  > 4 hours    Plan for MAC with Intravenous and Propofol induction. Reason for MAC:  Other - see comments  PONV prophylaxis:  Ondansetron (or other 5HT-3) and Dexamethasone or Solumedrol       Postoperative Care  Postoperative pain management:  IV analgesics and Oral pain medications.      Consents  Anesthetic plan, risks, benefits and alternatives discussed with:  Patient..                 KACI Castro CRNA  "

## 2019-01-10 ENCOUNTER — HOSPITAL ENCOUNTER (OUTPATIENT)
Facility: CLINIC | Age: 59
Discharge: HOME | End: 2019-01-10
Attending: SURGERY | Admitting: SURGERY
Payer: COMMERCIAL

## 2019-01-10 ENCOUNTER — RECORDS - HEALTHEAST (OUTPATIENT)
Dept: ADMINISTRATIVE | Facility: OTHER | Age: 59
End: 2019-01-10

## 2019-01-10 ENCOUNTER — ANESTHESIA (OUTPATIENT)
Dept: GASTROENTEROLOGY | Facility: CLINIC | Age: 59
End: 2019-01-10
Payer: COMMERCIAL

## 2019-01-10 VITALS
BODY MASS INDEX: 23.05 KG/M2 | OXYGEN SATURATION: 99 % | WEIGHT: 135 LBS | HEIGHT: 64 IN | TEMPERATURE: 98.2 F | RESPIRATION RATE: 14 BRPM | DIASTOLIC BLOOD PRESSURE: 78 MMHG | SYSTOLIC BLOOD PRESSURE: 123 MMHG | HEART RATE: 75 BPM

## 2019-01-10 LAB — COLONOSCOPY: NORMAL

## 2019-01-10 PROCEDURE — 25000125 ZZHC RX 250: Performed by: SURGERY

## 2019-01-10 PROCEDURE — 88305 TISSUE EXAM BY PATHOLOGIST: CPT | Mod: 26 | Performed by: SURGERY

## 2019-01-10 PROCEDURE — 25000125 ZZHC RX 250: Performed by: NURSE ANESTHETIST, CERTIFIED REGISTERED

## 2019-01-10 PROCEDURE — 25000128 H RX IP 250 OP 636: Performed by: SURGERY

## 2019-01-10 PROCEDURE — 45380 COLONOSCOPY AND BIOPSY: CPT | Performed by: SURGERY

## 2019-01-10 PROCEDURE — 88305 TISSUE EXAM BY PATHOLOGIST: CPT | Performed by: SURGERY

## 2019-01-10 PROCEDURE — 25000128 H RX IP 250 OP 636: Performed by: NURSE ANESTHETIST, CERTIFIED REGISTERED

## 2019-01-10 PROCEDURE — 45380 COLONOSCOPY AND BIOPSY: CPT | Mod: PT | Performed by: SURGERY

## 2019-01-10 PROCEDURE — 37000008 ZZH ANESTHESIA TECHNICAL FEE, 1ST 30 MIN: Performed by: SURGERY

## 2019-01-10 RX ORDER — PROPOFOL 10 MG/ML
INJECTION, EMULSION INTRAVENOUS CONTINUOUS PRN
Status: DISCONTINUED | OUTPATIENT
Start: 2019-01-10 | End: 2019-01-10

## 2019-01-10 RX ORDER — ONDANSETRON 2 MG/ML
4 INJECTION INTRAMUSCULAR; INTRAVENOUS
Status: COMPLETED | OUTPATIENT
Start: 2019-01-10 | End: 2019-01-10

## 2019-01-10 RX ORDER — LIDOCAINE 40 MG/G
CREAM TOPICAL
Status: DISCONTINUED | OUTPATIENT
Start: 2019-01-10 | End: 2019-01-10 | Stop reason: HOSPADM

## 2019-01-10 RX ORDER — SODIUM CHLORIDE, SODIUM LACTATE, POTASSIUM CHLORIDE, CALCIUM CHLORIDE 600; 310; 30; 20 MG/100ML; MG/100ML; MG/100ML; MG/100ML
INJECTION, SOLUTION INTRAVENOUS CONTINUOUS
Status: DISCONTINUED | OUTPATIENT
Start: 2019-01-10 | End: 2019-01-10 | Stop reason: HOSPADM

## 2019-01-10 RX ADMIN — PROPOFOL 200 MCG/KG/MIN: 10 INJECTION, EMULSION INTRAVENOUS at 08:07

## 2019-01-10 RX ADMIN — LIDOCAINE HYDROCHLORIDE 50 MG: 10 INJECTION, SOLUTION EPIDURAL; INFILTRATION; INTRACAUDAL; PERINEURAL at 08:07

## 2019-01-10 RX ADMIN — SODIUM CHLORIDE, POTASSIUM CHLORIDE, SODIUM LACTATE AND CALCIUM CHLORIDE: 600; 310; 30; 20 INJECTION, SOLUTION INTRAVENOUS at 07:10

## 2019-01-10 RX ADMIN — ONDANSETRON 4 MG: 2 INJECTION INTRAMUSCULAR; INTRAVENOUS at 08:07

## 2019-01-10 RX ADMIN — SODIUM CHLORIDE, POTASSIUM CHLORIDE, SODIUM LACTATE AND CALCIUM CHLORIDE: 600; 310; 30; 20 INJECTION, SOLUTION INTRAVENOUS at 07:27

## 2019-01-10 RX ADMIN — LIDOCAINE HYDROCHLORIDE 1 ML: 10 INJECTION, SOLUTION EPIDURAL; INFILTRATION; INTRACAUDAL; PERINEURAL at 07:28

## 2019-01-10 ASSESSMENT — MIFFLIN-ST. JEOR: SCORE: 1182.36

## 2019-01-10 NOTE — ANESTHESIA POSTPROCEDURE EVALUATION
Patient: Joselyn James    Procedure(s):  COMBINED COLONOSCOPY, SINGLE OR MULTIPLE BIOPSY/POLYPECTOMY BY BIOPSY    Diagnosis:screening  Diagnosis Additional Information: No value filed.    Anesthesia Type:  MAC    Note:  Anesthesia Post Evaluation    Patient location during evaluation: Bedside  Patient participation: Able to fully participate in evaluation  Level of consciousness: awake  Pain management: adequate  Airway patency: patent  Cardiovascular status: acceptable  Respiratory status: acceptable  Hydration status: stable  PONV: none     Anesthetic complications: None          Last vitals:  Vitals:    01/10/19 0701   BP: 146/85   Resp: 16   Temp: 36.8  C (98.2  F)   SpO2: 98%         Electronically Signed By: KACI Castro CRNA  January 10, 2019  8:23 AM

## 2019-01-10 NOTE — ANESTHESIA CARE TRANSFER NOTE
Patient: Joselyn James    Procedure(s):  COMBINED COLONOSCOPY, SINGLE OR MULTIPLE BIOPSY/POLYPECTOMY BY BIOPSY    Diagnosis: screening  Diagnosis Additional Information: No value filed.    Anesthesia Type:   MAC     Note:  Airway :Room Air  Patient transferred to:Phase II  Handoff Report: Identifed the Patient, Identified the Reponsible Provider, Reviewed the pertinent medical history, Discussed the surgical course, Reviewed Intra-OP anesthesia mangement and issues during anesthesia, Set expectations for post-procedure period and Allowed opportunity for questions and acknowledgement of understanding      Vitals: (Last set prior to Anesthesia Care Transfer)    CRNA VITALS  1/10/2019 0752 - 1/10/2019 0822      1/10/2019             Pulse:  73    Ht Rate:  73    SpO2:  98 %                Electronically Signed By: KACI Castro CRNA  January 10, 2019  8:22 AM

## 2019-01-10 NOTE — H&P
"58 year old year old female here for colonoscopy for screening.    There is no problem list on file for this patient.      Past Medical History:   Diagnosis Date     Skin cancer        No past surgical history on file.    @[facility]@     current outpatient medications on file.       Allergies   Allergen Reactions     Ceftazidime Anaphylaxis     Azithromycin Hives     Cefaclor Diarrhea     Cephalexin Diarrhea     Ciprofloxacin Other (See Comments)     lethargy     Clarithromycin Nausea     Erythromycin GI Disturbance     Influenza Vaccines Hives     Swelling at site     Penicillins Diarrhea     Emesis and difficulty breathing, HIVES     Pentosan Polysulfate Other (See Comments)     Nuts Other (See Comments) and Rash     Shaky, increased blood pressure, difficulty with breathing, Rash        Pt     Exam:  /85   Temp 98.2  F (36.8  C) (Oral)   Resp 16   Ht 1.626 m (5' 4\")   Wt 61.2 kg (135 lb)   SpO2 98%   Breastfeeding? No   BMI 23.17 kg/m      Awake, Alert OX3  Lungs - CTA bilaterally  CV - RRR, no murmurs, distal pulses intact  Abd - soft, non-distended, non-tender, +BS  Extr - No cyanosis or edema    A/P 58 year old year old female in need of colonoscopy for screening. Risks, benefits, alternatives, and complications were discussed including the possibility of perforation and the patient agreed to proceed    Chon Tracy MD     "

## 2019-01-11 LAB — COPATH REPORT: NORMAL

## 2019-08-26 ENCOUNTER — OFFICE VISIT - HEALTHEAST (OUTPATIENT)
Dept: FAMILY MEDICINE | Facility: CLINIC | Age: 59
End: 2019-08-26

## 2019-08-26 DIAGNOSIS — Z91.018 FOOD ALLERGY: ICD-10-CM

## 2019-08-26 ASSESSMENT — MIFFLIN-ST. JEOR: SCORE: 1221.12

## 2019-09-05 ENCOUNTER — OFFICE VISIT - HEALTHEAST (OUTPATIENT)
Dept: ALLERGY | Facility: CLINIC | Age: 59
End: 2019-09-05

## 2019-09-05 DIAGNOSIS — Z91.018 FOOD ALLERGY: ICD-10-CM

## 2019-09-05 ASSESSMENT — MIFFLIN-ST. JEOR: SCORE: 1212.05

## 2019-10-02 ENCOUNTER — COMMUNICATION - HEALTHEAST (OUTPATIENT)
Dept: FAMILY MEDICINE | Facility: CLINIC | Age: 59
End: 2019-10-02

## 2019-10-02 DIAGNOSIS — K27.9 PEPTIC ULCER: ICD-10-CM

## 2019-10-02 DIAGNOSIS — K50.90 CROHN'S DISEASE WITHOUT COMPLICATION, UNSPECIFIED GASTROINTESTINAL TRACT LOCATION (H): ICD-10-CM

## 2019-10-21 ENCOUNTER — HOSPITAL ENCOUNTER (OUTPATIENT)
Dept: MRI IMAGING | Facility: CLINIC | Age: 59
Discharge: HOME | End: 2019-10-21
Attending: INTERNAL MEDICINE | Admitting: INTERNAL MEDICINE
Payer: COMMERCIAL

## 2019-10-21 DIAGNOSIS — K86.2 PANCREAS CYST: ICD-10-CM

## 2019-10-21 PROCEDURE — 25500064 ZZH RX 255 OP 636: Performed by: RADIOLOGY

## 2019-10-21 PROCEDURE — 74183 MRI ABD W/O CNTR FLWD CNTR: CPT

## 2019-10-21 PROCEDURE — A9585 GADOBUTROL INJECTION: HCPCS | Performed by: RADIOLOGY

## 2019-10-21 RX ORDER — GADOBUTROL 604.72 MG/ML
0.1 INJECTION INTRAVENOUS ONCE
Status: COMPLETED | OUTPATIENT
Start: 2019-10-21 | End: 2019-10-21

## 2019-10-21 RX ADMIN — GADOBUTROL 9 ML: 604.72 INJECTION INTRAVENOUS at 08:40

## 2019-11-03 ENCOUNTER — HEALTH MAINTENANCE LETTER (OUTPATIENT)
Age: 59
End: 2019-11-03

## 2019-12-02 ENCOUNTER — OFFICE VISIT - HEALTHEAST (OUTPATIENT)
Dept: FAMILY MEDICINE | Facility: CLINIC | Age: 59
End: 2019-12-02

## 2019-12-02 DIAGNOSIS — Z00.00 ROUTINE GENERAL MEDICAL EXAMINATION AT A HEALTH CARE FACILITY: ICD-10-CM

## 2019-12-02 DIAGNOSIS — C44.92 SQUAMOUS CELL CARCINOMA OF SKIN: ICD-10-CM

## 2019-12-02 DIAGNOSIS — K27.9 PEPTIC ULCER: ICD-10-CM

## 2019-12-02 DIAGNOSIS — I10 HYPERTENSION: ICD-10-CM

## 2019-12-02 DIAGNOSIS — Z12.31 VISIT FOR SCREENING MAMMOGRAM: ICD-10-CM

## 2019-12-02 DIAGNOSIS — I10 ESSENTIAL HYPERTENSION: ICD-10-CM

## 2019-12-02 DIAGNOSIS — C44.90 SKIN CANCER: ICD-10-CM

## 2019-12-02 DIAGNOSIS — K50.90 CROHN'S DISEASE WITHOUT COMPLICATION, UNSPECIFIED GASTROINTESTINAL TRACT LOCATION (H): ICD-10-CM

## 2019-12-02 DIAGNOSIS — L70.9 ACNE, UNSPECIFIED ACNE TYPE: ICD-10-CM

## 2019-12-02 DIAGNOSIS — E78.5 HYPERLIPIDEMIA, UNSPECIFIED HYPERLIPIDEMIA TYPE: ICD-10-CM

## 2019-12-02 ASSESSMENT — MIFFLIN-ST. JEOR: SCORE: 1226.11

## 2019-12-16 ENCOUNTER — HOSPITAL ENCOUNTER (OUTPATIENT)
Dept: MAMMOGRAPHY | Facility: CLINIC | Age: 59
Discharge: HOME | End: 2019-12-16
Attending: FAMILY MEDICINE | Admitting: FAMILY MEDICINE
Payer: COMMERCIAL

## 2019-12-16 ENCOUNTER — RECORDS - HEALTHEAST (OUTPATIENT)
Dept: ADMINISTRATIVE | Facility: OTHER | Age: 59
End: 2019-12-16

## 2019-12-16 DIAGNOSIS — Z12.31 VISIT FOR SCREENING MAMMOGRAM: ICD-10-CM

## 2019-12-16 PROCEDURE — 77067 SCR MAMMO BI INCL CAD: CPT

## 2020-11-16 ENCOUNTER — HEALTH MAINTENANCE LETTER (OUTPATIENT)
Age: 60
End: 2020-11-16

## 2021-01-04 ENCOUNTER — COMMUNICATION - HEALTHEAST (OUTPATIENT)
Dept: FAMILY MEDICINE | Facility: CLINIC | Age: 61
End: 2021-01-04

## 2021-01-04 DIAGNOSIS — K27.9 PEPTIC ULCER: ICD-10-CM

## 2021-01-18 ENCOUNTER — OFFICE VISIT - HEALTHEAST (OUTPATIENT)
Dept: FAMILY MEDICINE | Facility: CLINIC | Age: 61
End: 2021-01-18

## 2021-01-18 ENCOUNTER — MEDICAL CORRESPONDENCE (OUTPATIENT)
Dept: HEALTH INFORMATION MANAGEMENT | Facility: CLINIC | Age: 61
End: 2021-01-18

## 2021-01-18 ENCOUNTER — COMMUNICATION - HEALTHEAST (OUTPATIENT)
Dept: FAMILY MEDICINE | Facility: CLINIC | Age: 61
End: 2021-01-18

## 2021-01-18 DIAGNOSIS — I10 HYPERTENSION: ICD-10-CM

## 2021-01-18 DIAGNOSIS — C44.90 SKIN CANCER: ICD-10-CM

## 2021-01-18 DIAGNOSIS — Z87.19 HISTORY OF GASTRITIS: ICD-10-CM

## 2021-01-18 DIAGNOSIS — J02.9 PHARYNGITIS, UNSPECIFIED ETIOLOGY: ICD-10-CM

## 2021-01-18 DIAGNOSIS — L70.9 ACNE, UNSPECIFIED ACNE TYPE: ICD-10-CM

## 2021-01-18 DIAGNOSIS — K50.90 CROHN'S DISEASE WITHOUT COMPLICATION, UNSPECIFIED GASTROINTESTINAL TRACT LOCATION (H): ICD-10-CM

## 2021-01-18 DIAGNOSIS — E78.5 HYPERLIPIDEMIA, UNSPECIFIED HYPERLIPIDEMIA TYPE: ICD-10-CM

## 2021-01-19 ENCOUNTER — MEDICAL CORRESPONDENCE (OUTPATIENT)
Dept: HEALTH INFORMATION MANAGEMENT | Facility: CLINIC | Age: 61
End: 2021-01-19

## 2021-01-19 DIAGNOSIS — I10 ESSENTIAL HYPERTENSION, MALIGNANT: Primary | ICD-10-CM

## 2021-01-19 DIAGNOSIS — E78.5 HYPERLIPEMIA: ICD-10-CM

## 2021-05-31 VITALS — HEIGHT: 65 IN | BODY MASS INDEX: 21.77 KG/M2 | WEIGHT: 130.7 LBS

## 2021-05-31 VITALS — HEIGHT: 65 IN | BODY MASS INDEX: 21.85 KG/M2 | WEIGHT: 131.13 LBS

## 2021-05-31 VITALS — HEIGHT: 65 IN | BODY MASS INDEX: 21.66 KG/M2 | WEIGHT: 130 LBS

## 2021-05-31 NOTE — PROGRESS NOTES
Assessment/ Plan     1. Food allergy concern  Patient has had a several year history of discrete episodes consisting of shaking, flushing, and nausea following eating.  She has been worked up in the past by GI and had a normal abdominal CT except for a pancreatic cyst that they are watching.  She has had blood work to rule out pheochromocytoma.  She feels like this is related to something that she is eating.  She just peanuts and tree nuts as a trigger.  She also thinks possibly MSG, fructose and wheat might be triggers.  These episodes are not classic for a food allergy reaction, however, it might warrant a visit with 1 of our allergist to discuss.  Unfortunately, she is currently being treated with supplements by her chiropractor at the cost of $500 per month and 27 pills/day.  She can no longer afford to do this and I feel it is likely unnecessary.  She states that she does get some relief when she takes Benadryl of her symptoms.  - Ambulatory referral to Allergy      Subjective:       Joselyn James is a 58 y.o. female who presents for discussion of possible food allergy.  This patient normally sees 1 of my partners, Dr. Nitish Alvarado.  She states that she has been having these episodes or reactions that she calls them for the last couple of years.  She states they always follow eating something.  She states that she will start to shake and feel nauseated.  Oftentimes her blood pressure will elevate.  She will feel really weak.  Her ears will turn red and she will feel dizzy and flushed.  She states often times there is a rash.  She shows me a photo of her legs which looks like reticularis livedo, but difficult to say for sure from the photo.  She states that definitely she has noticed if she eats peanuts or almonds she will get this reaction.  She never has a sensation of her lips or tongue swelling.  She does not have diarrhea from this.  She does not get any respiratory symptoms or hives.  She states that  "Benadryl can often help when she has the symptoms.  She has had several visits to the emergency room.  She was worked up for pheochromocytoma and that was normal.  She had abdominal CT which showed a cyst in the pancreas which they are following and think is likely benign.  She follows with GI as she has a history of Crohn's disease.  They did not really give her any ideas of what they thought these episodes may be related to.  She is currently seeing a chiropractor who is treating her for \"leaky gut\" and \"adrenal insufficiency\".  She is currently taking 27 supplements a day and this is costing her $500 a month.  She states she can no longer afford to do this.  She states he had her on an elimination diet.  Initially she was just eating chicken broth and then slowly reintroduced mainly [employer].  She has noticed when she has had MSG this will cause a reaction and she is also noticed high fructose corn syrup and wheat will also trigger an episode.  She does feel that Benadryl will help the symptoms.  She has never seen an allergist but would like to.    Relevant past medical, family, surgical, and social history reviewed with patient, unless noted in HPI, not pertinent for this visit.  Medications were discussed and reconciled.   Review of Systems   A 12 point comprehensive review of systems was negative except as noted.      Current Outpatient Medications   Medication Sig Dispense Refill     EPINEPHrine (EPIPEN) 0.3 mg/0.3 mL atIn Inject 0.3 mg into the shoulder, thigh, or buttocks as needed.        metoprolol succinate (TOPROL-XL) 25 MG Take 1 tablet (25 mg total) by mouth daily. 90 tablet 3     omeprazole (PRILOSEC) 20 MG capsule Take 1 capsule (20 mg total) by mouth daily before breakfast. 90 capsule 3     sulfaSALAzine (AZULFIDINE) 500 mg tablet Take 2 tablets (1,000 mg total) by mouth 2 (two) times a day. 360 tablet 2     No current facility-administered medications for this visit.        Objective:      BP " "126/72   Pulse 68   Temp 98.3  F (36.8  C)   Resp 16   Ht 5' 4.5\" (1.638 m)   Wt 144 lb (65.3 kg)   BMI 24.34 kg/m        General appearance: alert, appears stated age and cooperative  Head: Normocephalic, without obvious abnormality, atraumatic  Eyes: conjunctivae/corneas clear.   Ears: normal TM's and external ear canals both ears  Nose: Nares normal. Septum midline. Mucosa normal. No drainage or sinus tenderness.  Throat: lips, mucosa, and tongue normal; teeth and gums normal  Neck: no adenopathy  Lungs: clear to auscultation bilaterally  Heart: regular rate and rhythm, S1, S2 normal, no murmur, click, rub or gallop  Abdomen: soft, non-tender; bowel sounds normal; no masses,  no organomegaly  Extremities: extremities normal, atraumatic, no cyanosis or edema  Pulses: 2+ and symmetric      No results found for this or any previous visit (from the past 168 hour(s)).       This note has been dictated using voice recognition software. Any grammatical or context distortions are unintentional and inherent to the software  "

## 2021-06-01 VITALS — BODY MASS INDEX: 22.33 KG/M2 | HEIGHT: 65 IN | WEIGHT: 134 LBS

## 2021-06-01 NOTE — PROGRESS NOTES
Assessment:    Symptoms most consistent with food intolerance/sensitivity.  Identified foods of MSG, fructose, wheat, Allmond.  Symptoms are not consistent with classic IgE mediated food allergy    Plan:    Food avoidance as tolerated.  Foods can safely be eaten.  No EpiPen is needed.  Elimination diets can be done if additional foods are identified.  Recommend 6 weeks off followed by reintroduction for 2 weeks.  ____________________________________________________________________________     Patient comes in today for concerns about allergic reactions to food.  She is noticed this over the past 2 years.  She had different reactions to different foods.  She notes with Allmond she had nasal congestion and sneezing and then throat tightness.  She did use Benadryl.  Unclear whether this was beneficial.  She also identifies foods like MSG fructose and aspartame is causing shaky sensation, head rush sensation, flushing.  She is identified wheat is causing stomach upset and flushing symptoms.  She does have contact rashes with lip balm, Band-Aids and latex.  Patient was seen in September 2017 in the emergency department with a possible food allergic reaction.  I did review those records.  She had a blood pressure 154/97.  No objective symptoms of food allergy.  No hives.  At that time she had had some Allmond.  Again the patient was seen in emergency room in December 2017 for concern of possible food allergic reaction.  At that time had some chocolate which she was concerned there might of been not contamination.  Had hypertension.  She had a blood pressure of 180/104.  24-hour urine catecholamines were obtained which were negative.  She does have a history of hypertension outside of these episodes.  No description of urticaria or angioedema.  No wheezing or shortness of breath.  No hypotension.    Review of symptoms:  As above, otherwise negative    Past medical history: Crohn's disease.  History of colon resection.   History of cholecystectomy and hysterectomy.  No other chronic medical conditions noted.    Allergies: No known allergies hymenoptera venom.  History of multiple drug allergies including ceftazidime, azithromycin, Ceclor, cephalexin, ciprofloxacin, clarithromycin, erythromycin, flu vaccine, heparinoids, pentosan polysulfate sodium, penicillins.    Family history: Brother with asthma.  Multiple members of the family with allergies.    Social history: Currently lives at home that has forced air heat and central air conditioning.  There is a basement present.  No pets in the home.  No current cigarette smoking.    Medications: reviewed in chart    Physical Exam:  General:  Alert and in no apparent distress.  Eyes:  Sclera clear.  Ears: TMs translucent grey with bony landmarks visible. Nose: Pale, boggy mucosal membranes.  Throat: Pink, moist.  No lesions.  Neck: Supple.  No lymphadenopathy.  Lungs: CTA.  CV: Regular rate and rhythm. Extremities: Well perfused.  No clubbing or cyanosis. Skin: No rash    Last Food Skin Allergy Test Results  Major Allergens  Wheat  1:20 (W/F in mm): 0/0 (09/05/19 1542)  Tree Nuts  Camilla  1:20 (W/F in mm): 0/0 (09/05/19 1542)  Controls  Device Type: QUINTIP (09/05/19 1542)  Neg. Control: 50% Glycerine-Saline H (W/F in millimeters): 0/0 (09/05/19 1542)  Pos. Control Histamine 6 mg/ml (W/F in millimeters): 5/F (09/05/19 1542)    45 min spent in direct contact with the patient apart from testing.  More than 50% in counseling regarding food allergy and food intolerance.  Discussed management of food intolerance and elimination diet.

## 2021-06-01 NOTE — PATIENT INSTRUCTIONS - HE
Assessment:    Symptoms most consistent with food intolerance/sensitivity.  Identified foods of MSG, fructose, wheat, Allmond.  Symptoms are not consistent with classic IgE mediated food allergy    Plan:    Food avoidance as tolerated.  Foods can safely be eaten.  No EpiPen is needed.  Elimination diets can be done if additional foods are identified.  Recommend 6 weeks off followed by reintroduction for 2 weeks.

## 2021-06-02 VITALS — WEIGHT: 138 LBS | HEIGHT: 65 IN | BODY MASS INDEX: 22.99 KG/M2

## 2021-06-02 NOTE — TELEPHONE ENCOUNTER
RN cannot approve Refill Request    RN can NOT refill this medication med is not covered by policy/route to provider     Marybel Delgado, [facility] Connection Triage/Med Refill 10/3/2019    Requested Prescriptions   Pending Prescriptions Disp Refills     sulfaSALAzine (AZULFIDINE) 500 mg tablet [Pharmacy Med Name: SULFASALAZINE 500MG TABS] 360 tablet 0     Sig: TAKE TWO TABLETS BY MOUTH TWICE A DAY       There is no refill protocol information for this order      Signed Prescriptions Disp Refills    omeprazole (PRILOSEC) 20 MG capsule 90 capsule 3     Sig: TAKE ONE CAPSULE BY MOUTH EVERY DAY BEFORE BREAKFAST       GI Medications Refill Protocol Passed - 10/2/2019  3:14 PM        Passed - PCP or prescribing provider visit in last 12 or next 3 months.     Last office visit with prescriber/PCP: 1/2/2018 Nitish Alvarado MD OR same dept: 8/26/2019 Kimberlee Torres MD OR same specialty: 8/26/2019 Kimberlee Torres MD  Last physical: 11/29/2018 Last MTM visit: Visit date not found   Next visit within 3 mo: Visit date not found  Next physical within 3 mo: Visit date not found  Prescriber OR PCP: Nitish Alvarado MD  Last diagnosis associated with med order: 1. Peptic ulcer  - omeprazole (PRILOSEC) 20 MG capsule; TAKE ONE CAPSULE BY MOUTH EVERY DAY BEFORE BREAKFAST  Dispense: 90 capsule; Refill: 3    If protocol passes may refill for 12 months if within 3 months of last provider visit (or a total of 15 months).

## 2021-06-02 NOTE — TELEPHONE ENCOUNTER
Refill Approved    Rx renewed per Medication Renewal Policy. Medication was last renewed on 10/15/18.    Marybel Delgado, [facility] Triage/Med Refill 10/3/2019     Requested Prescriptions   Pending Prescriptions Disp Refills     omeprazole (PRILOSEC) 20 MG capsule [Pharmacy Med Name: OMEPRAZOLE 20MG CPDR] 90 capsule 3     Sig: TAKE ONE CAPSULE BY MOUTH EVERY DAY BEFORE BREAKFAST       GI Medications Refill Protocol Passed - 10/2/2019  3:14 PM        Passed - PCP or prescribing provider visit in last 12 or next 3 months.     Last office visit with prescriber/PCP: 1/2/2018 Nitish Alvarado MD OR same dept: 8/26/2019 Kimberlee Torres MD OR same specialty: 8/26/2019 Kimberlee Torres MD  Last physical: 11/29/2018 Last MTM visit: Visit date not found   Next visit within 3 mo: Visit date not found  Next physical within 3 mo: Visit date not found  Prescriber OR PCP: Nitish Alvarado MD  Last diagnosis associated with med order: There are no diagnoses linked to this encounter.  If protocol passes may refill for 12 months if within 3 months of last provider visit (or a total of 15 months).             sulfaSALAzine (AZULFIDINE) 500 mg tablet [Pharmacy Med Name: SULFASALAZINE 500MG TABS] 360 tablet 0     Sig: TAKE TWO TABLETS BY MOUTH TWICE A DAY       There is no refill protocol information for this order

## 2021-06-02 NOTE — TELEPHONE ENCOUNTER
Pt states that she sees GI 1x per year but they do not fill any meds, historically her Family MD has filled this. If approp can PMD fill? Pt also states that she plans to schedule a CPE soon. Thanks.

## 2021-06-02 NOTE — TELEPHONE ENCOUNTER
Can you please check with patient.  She is on sulfasalazine and the refill came to me.  Typically, this is managed by gastroenterology.  Can you please clarify whether she is still seeing gastroenterology as this should go to them? Thanks.

## 2021-06-03 VITALS
HEART RATE: 84 BPM | SYSTOLIC BLOOD PRESSURE: 148 MMHG | DIASTOLIC BLOOD PRESSURE: 82 MMHG | BODY MASS INDEX: 23.66 KG/M2 | HEIGHT: 65 IN | WEIGHT: 142 LBS

## 2021-06-03 VITALS — HEIGHT: 65 IN | WEIGHT: 144 LBS | BODY MASS INDEX: 23.99 KG/M2

## 2021-06-03 NOTE — PATIENT INSTRUCTIONS - HE
Remain active  Monitor your blood pressure. Continue metoprolol. This may need to be adjusted  Set up your mammogram  Remember adequate calcium 1200 mg plus vitamin D 1,000 units daily

## 2021-06-04 VITALS
HEIGHT: 65 IN | HEART RATE: 76 BPM | WEIGHT: 145.1 LBS | BODY MASS INDEX: 24.18 KG/M2 | SYSTOLIC BLOOD PRESSURE: 170 MMHG | DIASTOLIC BLOOD PRESSURE: 92 MMHG

## 2021-06-04 NOTE — PROGRESS NOTES
Assessment/ Plan     1. Routine general medical examination at a health care facility    Recommend adequate calcium 1200 mg plus vitamin D 1000 units daily  Recommend weightbearing exercise as tolerated  Refer for a mammogram  She continues to follow-up with gastroenterology is up-to-date on her colonoscopy    2. Visit for screening mammogram    Refer for mammogram  - Mammo Screening Bilateral; Future    3. Crohn's disease without complication, unspecified gastrointestinal tract location (H)  Continue sulfasalazine    Follow-up with gastroenterology  - sulfaSALAzine (AZULFIDINE) 500 mg tablet; Take 2 tablets (1,000 mg total) by mouth 2 (two) times a day.  Dispense: 360 tablet; Refill: 3    4. Essential hypertension    Currently stable  Continue metoprolol  I reviewed side effects.  Her preference is to stay on this medication and not to switch to other first-line medications such as lisinopril, hydrochlorothiazide, or a calcium channel blocker  - HM2(CBC w/o Differential); Future  - Basic Metabolic Panel; Future    5. Squamous cell carcinoma of skin  6. Skin cancer    Continue to follow-up with dermatology    7. Hyperlipidemia, unspecified hyperlipidemia type    She will check a lipid cascade in the future  Calculate the 10-year cardiovascular risk  - Hepatic Profile; Future  - Lipid Cascade; Future    8. Acne, unspecified acne type    Refilled clindamycin  - clindamycin (CLINDAGEL) 1 % gel; Apply topically 2 (two) times a day.  Dispense: 60 g; Refill: 6    9.  History of peptic ulcer    Treat with omeprazole  - omeprazole (PRILOSEC) 20 MG capsule; TAKE ONE CAPSULE BY MOUTH EVERY DAY BEFORE BREAKFAST  Dispense: 90 capsule; Refill: 3        Subjective:       Joselyn James is a 59 y.o. female who presents for a complete physical examination.  Her medical history is notable for Crohn's colitis with a previous partial colectomy.  She continues to follow-up with gastroenterology and is treated with sulfasalazine.  She  "also has had an evaluation for a cystic lesion in the pancreas of uncertain significance.  She had an MRI as directed by gastroenterology and this showed cystic lesions.  There was concern mentioned about possible IPMN and this will be monitored.  Her Crohn's disease has been stable.    She has had a previous hysterectomy.  She continues to follow-up with her OB/GYN.    She does have a history of hypertension which is treated with metoprolol currently.  She continues to take omeprazole for reflux symptoms.    Overall, she has been feeling well.  She denies concerns with chest pain, shortness of breath, palpitations, or urinary concerns.    She continues to follow-up with dermatology given a history of skin cancer.      The following portions of the patient's history were reviewed and updated as appropriate: allergies, current medications, past family history, past medical history, past social history, past surgical history and problem list. Medications have been reconciled    Review of Systems   A 12 point comprehensive review of systems was negative except as noted.      Current Outpatient Medications   Medication Sig Dispense Refill     metoprolol succinate (TOPROL-XL) 25 MG Take 1 tablet (25 mg total) by mouth daily. 90 tablet 3     omeprazole (PRILOSEC) 20 MG capsule TAKE ONE CAPSULE BY MOUTH EVERY DAY BEFORE BREAKFAST 90 capsule 3     sulfaSALAzine (AZULFIDINE) 500 mg tablet Take 2 tablets (1,000 mg total) by mouth 2 (two) times a day. 360 tablet 3     clindamycin (CLINDAGEL) 1 % gel Apply topically 2 (two) times a day. 60 g 6     EPINEPHrine (EPIPEN) 0.3 mg/0.3 mL atIn Inject 0.3 mg into the shoulder, thigh, or buttocks as needed.        No current facility-administered medications for this visit.        Objective:      BP (!) 170/92   Pulse 76   Ht 5' 4.5\" (1.638 m)   Wt 145 lb 1.6 oz (65.8 kg)   BMI 24.52 kg/m        General appearance: alert, appears stated age and cooperative  Head: Normocephalic, " without obvious abnormality, atraumatic  Eyes: conjunctivae/corneas clear. PERRL, EOM's intact.   Ears: normal TM's and external ear canals both ears  Nose: Nares normal. Septum midline. Mucosa normal. No drainage or sinus tenderness.  Throat: lips, mucosa, and tongue normal; teeth and gums normal  Neck: no adenopathy, supple, symmetrical  Back: symmetric, no curvature. ROM normal. No CVA tenderness.  Lungs: clear to auscultation bilaterally  Heart: regular rate and rhythm, S1, S2 normal, no murmur, click, rub or gallop  Abdomen: soft, non-tender; bowel sounds normal; no masses,  no organomegaly  Extremities: extremities normal, atraumatic, no cyanosis or edema  Skin: Skin color, texture, turgor normal  Lymph nodes: Cervical nodes normal.  Neurologic: Alert and oriented X 3         No results found for this or any previous visit (from the past 168 hour(s)).       This note has been dictated using voice recognition software. Any grammatical or context distortions are unintentional and inherent to the software

## 2021-06-14 NOTE — PROGRESS NOTES
Joselyn James is a 60 y.o. female who is being evaluated via a billable telephone visit.      What phone number would you like to be contacted at? [phone]  How would you like to obtain your AVS? AVS Preference: Nina.      Assessment/ Plan     1. Hypertension    Continue Metroprolol as she has tolerated this medication  Follow-up in the clinic for a blood pressure check  Recommend laboratory testing as noted  - metoprolol succinate (TOPROL-XL) 25 MG; Take 1 tablet (25 mg total) by mouth daily.  Dispense: 90 tablet; Refill: 3  - Basic Metabolic Panel; Future  - HM2(CBC w/o Differential); Future    2. Hyperlipidemia, unspecified hyperlipidemia type    We will check a lipid cascade  Calculate the 10-year cardiovascular risk  - Lipid Cascade; Future  - Hepatic Profile; Future    3. Pharyngitis, unspecified etiology  Consider possible viral syndrome including Covid    Recommend consideration for Covid testing  She notes that symptoms have been improving significantly and she will continue to monitor  Recommend symptomatic cares  Follow-up recommended if having concerning symptoms    4. Skin cancer    Refer to dermatology given history of skin cancer  - Ambulatory referral to Dermatology    5. Acne, unspecified acne type    Refilled clindamycin gel  - clindamycin (CLINDAGEL) 1 % gel; Apply topically 2 (two) times a day.  Dispense: 60 g; Refill: 6    6. Crohn's disease without complication, unspecified gastrointestinal tract location (H)    She is due to follow-up with gastroenterology  We will refill sulfasalazine until she is able to follow-up with them given her need  - sulfaSALAzine (AZULFIDINE) 500 mg tablet; Take 2 tablets (1,000 mg total) by mouth 2 (two) times a day.  Dispense: 360 tablet; Refill: 0    7. History of gastritis    Continue omeprazole  - omeprazole (PRILOSEC) 20 MG capsule; TAKE ONE CAPSULE BY MOUTH EVERY DAY BEFORE BREAKFAST  Dispense: 90 capsule; Refill: 3      Subjective:       Joselyn DIAMOND  Jacob is a 60 y.o. female who presents for a telephone visit in the COVID-19 pandemic.  She presents for medication check and also has had a recent constellation of symptoms.  She reports that at one point she had developed a sore throat as well as some left ear discomfort over the course of 3 or 4 days.  She felt a bit nauseated as well.  Those symptoms have resolved.  She denies fever or loss of taste or smell.  She has not abdominal pain, diarrhea, or constipation.  Her energy level has been fine and she does not feel achy.    Her medical history is otherwise notable for Crohn's colitis with previous partial colectomy.  She continues to follow-up with gastroenterology is treated with sulfasalazine.  She has follow-up with gastroenterology regarding a cystic lesion of the pancreas as well.    For hypertension she continues to take Metroprolol.  She has a history of gastritis and takes omeprazole on a consistent basis as well.  Finally, she has had some skin issues including acne treated with clindamycin gel.  She has a history of skin cancer as well and has followed up with dermatology.        Her medical history is notable for Crohn's colitis with a previous partial colectomy.  She continues to follow-up with gastroenterology and is treated with sulfasalazine.  She also has had an evaluation for a cystic lesion in the pancreas of uncertain significance.  She had an MRI as directed by gastroenterology and this showed cystic lesions.  There was concern mentioned about possible IPMN and this will be monitored.  Her Crohn's disease has been stable.    The following portions of the patient's history were reviewed and updated as appropriate: allergies, current medications, past family history, past medical history, past social history, past surgical history and problem list. Medications have been reconciled    Review of Systems   A 12 point comprehensive review of systems was negative except as noted.      Current  Outpatient Medications   Medication Sig Dispense Refill     clindamycin (CLINDAGEL) 1 % gel Apply topically 2 (two) times a day. 60 g 6     EPINEPHrine (EPIPEN) 0.3 mg/0.3 mL atIn Inject 0.3 mg into the shoulder, thigh, or buttocks as needed.        metoprolol succinate (TOPROL-XL) 25 MG Take 1 tablet (25 mg total) by mouth daily. 90 tablet 3     omeprazole (PRILOSEC) 20 MG capsule TAKE ONE CAPSULE BY MOUTH EVERY DAY BEFORE BREAKFAST 90 capsule 3     sulfaSALAzine (AZULFIDINE) 500 mg tablet Take 2 tablets (1,000 mg total) by mouth 2 (two) times a day. 360 tablet 0     No current facility-administered medications for this visit.        Objective:      There were no vitals taken for this visit.    Examination not performed as this is a telephone visit  General: Does not sound acutely distressed  There is no audible wheezing  Speech is clear  Judgment and insight are intact    No results found for this or any previous visit (from the past 168 hour(s)).       This note has been dictated using voice recognition software. Any grammatical or context distortions are unintentional and inherent to the software    Nitish Alvarado MD      Phone call duration: 15 minutes

## 2021-06-14 NOTE — TELEPHONE ENCOUNTER
Please call. I just saw her for a virtual visit. She has two needs. First, she chasidy like to do her fasting labs at [facility]. I believe that is an option but orders would need to be faxed. Orders entered.    Next, she would like to see [facility] who she saw in [place] in the past. Please give her the referral number for them. Thanks!

## 2021-06-14 NOTE — TELEPHONE ENCOUNTER
Reason for Call:  Medication Refill    Do you use a [facility]?  Name of the pharmacy and phone number for the current request: [facility] in [place]    Name of the medication requested: Omeprazole 20 mg    Other request: Patient has an appt scheduled with provider for 01/18/20, but she only has about 2-3 days left of the omeprazole and would like if provider can send a refill to her pharmacy. The rest of her rx will be good until she sees the provider.     Can we leave a detailed message on this number? Yes    Phone number patient can be reached at:   Cell number on file:    Telephone Information:   Mobile [phone]       Best Time: Any time    Call taken on 1/4/2021 at 3:24 PM by Nani Avendano

## 2021-06-15 NOTE — ANESTHESIA CARE TRANSFER NOTE
Last vitals:   Vitals:    02/09/18 0900   BP: 131/75   Pulse: 73   Resp: 16   Temp: 36.4  C (97.6  F)   SpO2: 97%     Patient's level of consciousness is drowsy  Spontaneous respirations: yes  Maintains airway independently: yes  Dentition unchanged: yes  Oropharynx: oropharynx clear of all foreign objects    QCDR Measures:  ASA# 20 - Surgical Safety Checklist: WHO surgical safety checklist completed prior to induction  PQRS# 430 - Adult PONV Prevention: 4558F - Pt received => 2 anti-emetic agents (different classes) preop & intraop  ASA# 8 - Peds PONV Prevention: NA - Not pediatric patient, not GA or 2 or more risk factors NOT present  PQRS# 424 - Emma-op Temp Management: 4559F - At least one body temp DOCUMENTED => 35.5C or 95.9F within required timeframe  PQRS# 426 - PACU Transfer Protocol: - Transfer of care checklist used  ASA# 14 - Acute Post-op Pain: ASA14B - Patient did NOT experience pain >= 7 out of 10

## 2021-06-15 NOTE — ANESTHESIA POSTPROCEDURE EVALUATION
Patient: Joselyn James  ENDOSCOPIC ULTRASOUND WITH FINE NEEDLE ASPIRATION  Anesthesia type: MAC    Patient location: [facility] 8  Last vitals:   Vitals:    02/09/18 0900   BP: 131/75   Pulse: 73   Resp: 16   Temp: 36.4  C (97.6  F)   SpO2: 97%     Post vital signs: stable  Level of consciousness: alert  Post-anesthesia pain: pain controlled  Post-anesthesia nausea and vomiting: no  Pulmonary: room air  Cardiovascular: stable and blood pressure at baseline  Hydration: adequate  Anesthetic events: no    QCDR Measures:  ASA# 11 - Emma-op Cardiac Arrest: ASA11B - Patient did NOT experience unanticipated cardiac arrest  ASA# 12 - Emma-op Mortality Rate: ASA12B - Patient did NOT die  ASA# 13 - PACU Re-Intubation Rate: ASA13B - Patient did NOT require a new airway mgmt  ASA# 10 - Composite Anes Safety: ASA10A - No serious adverse event    Additional Notes:

## 2021-06-15 NOTE — ANESTHESIA PREPROCEDURE EVALUATION
Anesthesia Evaluation      Patient summary reviewed   History of anesthetic complications     Airway   Mallampati: I  Neck ROM: full   Pulmonary - negative ROS and normal exam                          Cardiovascular - normal exam  Exercise tolerance: > or = 4 METS  (+) hypertension poorly controlled, ,      Neuro/Psych - negative ROS     Endo/Other - negative ROS      GI/Hepatic/Renal    (+) GERD well controlled,        Other findings: H/o PONV. Motion sickness.  Crohn's, partial colectomy.  Remote PUD.  SCC hx.  Possible IPMN tumor in pancreas.  Occasional dizziness and flushing.      Dental - normal exam                        Anesthesia Plan  Planned anesthetic: MAC    ASA 2     Anesthetic plan and risks discussed with: patient    Post-op plan: routine recovery

## 2021-06-15 NOTE — PROGRESS NOTES
Assessment/ Plan     1. Flushing  Etiology unclear.  Consider possible endocrine abnormality though her thyroid test was normal recently.  She may be experiencing food allergies.  Patient is concerned about a possible adrenal gland problem and pheochromocytoma has been mentioned    We will review records from her previous clinic  A 24-hour urine test has been requested to check for urinary catecholamines from her previous clinic. Recommend having that forwarded  Refer for CT scan of the abdomen and pelvis initially  Reviewed options including referring her to our allergy team to evaluate for possible food allergies given her reactions associated with eating  Given her history of Crohn's disease strongly recommend that she follow-up with gastroenterology.    This has been managed by her previous primary care physician who has since retired  Recommend that she keep a food diary    Given her elevated blood pressure recommend follow-up for a recheck.  Consider an antihypertensive if warranted.  Consider further evaluation including an echocardiogram or possible stress test  Consider also a Holter test.    2. Skin cancer  Squamous cell carcinoma    Recommend regular follow-up with dermatology    3. Crohn's disease  Status post partial colectomy    She will continue sulfasalazine  Refer for CT scan of the abdomen and pelvis  - CT Abdomen Pelvis With Oral With IV Contrast; Future  Recommend regular colonoscopies  Recommend follow-up with gastroenterology in a regular basis    4. Elevated blood pressure    Given her elevated blood pressure recommend follow-up for a recheck.  Consider an antihypertensive if warranted.  Consider further evaluation including an echocardiogram or possible stress test  Consider also a Holter test.    5. H/O Peptic Ulcer    Continue omeprazole and take daily  If having breakthrough symptoms refer to an upper endoscopy    Reviewed available records through care everywhere  Reviewed past medical  history, surgical history, family history, and current medications  Reviewed allergies    Subjective:       Joselyn James is a 57 y.o. female who presents to the clinic to establish care.  Her medical history is notable for Crohn's disease with previous partial colectomy.  She most recently has been treated with sulfasalazine which has been managed by her primary care physician.  She has not followed up with gastroenterology or had regular testing.  Medical history is otherwise notable for previous peptic ulcer as well as squamous cell carcinoma treated previously.    Her primary concern is that she has been experiencing unusual symptoms on an intermittent basis.  She reports that will experience episodes where she feels dizzy and flushed.  Her blood pressure has spiked on occasion.  She has not been diagnosed with hypertension previously.  She states that there are times where the symptoms are associated with eating.  The most recent event occurred when she was eating chocolate.  She has not been diagnosed or evaluated for obvious food allergies.  She most recently had emergency department visit and states that a 24-hour urine test was recently submitted.  This may have been testing for urinary catecholamines to evaluate for pheochromocytoma.    Her blood pressure is elevated today.  She states her blood pressure is often higher when visiting the clinic.  She denies headache or visual disturbances.  She has not had chest pain with exertion, orthopnea, or paroxysmal nocturnal dyspnea.  Review of the chart shows that she has had laboratory testing.  Most recent hemoglobin is 13.7 and white count was slightly low at 3600.  Her total cholesterol was 247 with an LDL of 169.    The following portions of the patient's history were reviewed and updated as appropriate: allergies, current medications, past family history, past medical history, past social history, past surgical history and problem list.    Review of  "Systems   A 12 point comprehensive review of systems was negative except as noted.      Current Outpatient Prescriptions   Medication Sig Dispense Refill     clindamycin-benzoyl peroxide (BENZACLIN) gel Apply topically.       EPINEPHrine (EPIPEN) 0.3 mg/0.3 mL atIn Inject 0.3 mg into the shoulder, thigh, or buttocks.       omeprazole (PRILOSEC) 20 MG capsule Take 20 mg by mouth.       sulfaSALAzine (AZULFIDINE) 500 mg tablet Take 500 mg by mouth.       No current facility-administered medications for this visit.        Objective:      BP (!) 146/94  Pulse 72  Temp 97.9  F (36.6  C) (Oral)   Resp 12  Ht 5' 4.5\" (1.638 m)  Wt 131 lb 2 oz (59.5 kg)  Breastfeeding? No  BMI 22.16 kg/m2      General appearance: alert, appears stated age and cooperative  Head: Normocephalic, without obvious abnormality, atraumatic  Eyes: conjunctivae/corneas clear. PERRL, EOM's intact.   Ears: normal TM's and external ear canals both ears  Nose: Nares normal. Septum midline. Mucosa normal. No drainage or sinus tenderness.  Throat: lips, mucosa, and tongue normal; teeth and gums normal  Neck: no adenopathy, supple, symmetrical, trachea midline and thyroid not enlarged, symmetric  Back: symmetric, no curvature. ROM normal. No CVA tenderness.  Lungs: clear to auscultation bilaterally  Heart: regular rate and rhythm, S1, S2 normal, no murmur, click, rub or gallop  Abdomen: soft, non-tender; bowel sounds normal; no masses,  no organomegaly  Extremities: extremities normal, atraumatic, no cyanosis or edema  Skin: Skin color, texture, turgor normal. No rashes or lesions  Lymph nodes: Cervical nodes normal.  Neurologic: Alert and oriented X 3. Normal coordination and gait         No results found for this or any previous visit (from the past 168 hour(s)).       This note has been dictated using voice recognition software. Any grammatical or context distortions are unintentional and inherent to the software  "

## 2021-06-15 NOTE — PROGRESS NOTES
Preoperative Exam    Scheduled Procedure: Endoscopic ultrasound  Surgery Date:  1/26/18 or 2/9/18  Surgery Location: [facility]   Surgeon:  To be determined (she will provide information)        Assessment/Plan:     1. Preoperative examination  2. Pancreatic cyst    An ECG is pending.   Follow-up next week for an ECG  - Electrocardiogram Perform and Read    A hemoglobin is 14.1 and potassium level is normal  Recommend avoiding NSAIDs and supplements prior to the procedure  Follow-up as recommended by gastroenterology    She will follow-up to recheck blood pressure.  Favor an antihypertensive medication if her blood pressure remains elevated    Addendum: Her ECG reveals normal sinus rhythm without acute changes  We will start metoprolol XL 25 mg daily for blood pressure    3. Screen for colon cancer    A colonoscopy is due    4. Hypertension    Recommend lisinopril to control blood pressure  Follow-up to recheck blood pressure    She will start metoprolol as noted        Surgical Procedure Risk: Low (reported cardiac risk generally < 1%)  Have you had prior anesthesia?: Yes  Have you or any family members had a previous anesthesia reaction:  No  Do you or any family members have a history of a clotting or bleeding disorder?: No  Cardiac Risk Assessment: no increased risk for major cardiac complications    Patient approved for surgery with general or local anesthesia.  This is contingent upon her ECG being normal and her blood pressure being well controlled  She will follow-up for her ECG next week and will need her blood pressure to be well-regulated        Please Note:    Functional Status: Independant  Patient plans to recover at home with family.     Subjective:      Joselyn James is a 57 y.o. female who presents for a preoperative consultation.  She recently established care in this clinic.  Her medical history is notable for Crohn's colitis with previous partial colectomy.  She has experienced  occasional dizziness and episodic flushing.  Blood pressure was elevated at the last visit.  She was referred for a CT scan of the abdomen which showed a cystic lesion in the pancreas of uncertain significance.  It is possible that this is an IPMN Tumor.  She was therefore referred to gastroenterology and will need further evaluation.  She will require endoscopic ultrasound for further evaluation.  She will continue sulfasalazine given her history of Crohn's colitis.  A colonoscopy has also been recommended.    Also, her blood pressure has been more elevated and remains elevated today.  She is convinced that her blood pressure is better when at home.  She will bring her meter for a recheck.  Consider treatment with lisinopril or metoprolol if her blood pressure remains elevated.  She otherwise denies recent respiratory infection, fever, chest pain, cough, or shortness of breath.    All other systems reviewed and are negative, other than those listed in the HPI.    Pertinent History  Do you have difficulty breathing or chest pain after walking up a flight of stairs: No  History of obstructive sleep apnea: No  Steroid use in the last 6 months: No  Frequent Aspirin/NSAID use: No  Prior Blood Transfusion: No  Prior Blood Transfusion Reaction: No  If for some reason prior to, during or after the procedure, if it is medically indicated, would you be willing to have a blood transfusion?:  There is no transfusion refusal.    Current Outpatient Prescriptions   Medication Sig Dispense Refill     clindamycin-benzoyl peroxide (BENZACLIN) gel Apply topically.       EPINEPHrine (EPIPEN) 0.3 mg/0.3 mL atIn Inject 0.3 mg into the shoulder, thigh, or buttocks.       omeprazole (PRILOSEC) 20 MG capsule Take 20 mg by mouth.       sulfaSALAzine (AZULFIDINE) 500 mg tablet Take 500 mg by mouth.       No current facility-administered medications for this visit.         Allergies   Allergen Reactions     Ceftazidime Anaphylaxis      "Azithromycin Hives     Cefaclor Diarrhea     Cephalexin Diarrhea     Ciprofloxacin Dizziness     lethargy     Clarithromycin Nausea Only     Erythromycin Nausea Only     Influenza Virus Vaccines Hives     Swelling at site     Latex      Added based on information entered during case entry, please review and add reactions, type, and severity as needed     Peanut Other (See Comments)     Shaky, increased blood pressure, difficulty with breathing, Rash      Penicillins Diarrhea     Emesis and difficulty breathing, HIVES     Pentosan Polysulfate Sodium Other (See Comments)     Other Allergy (See Comments) Other (See Comments) and Rash     Shaky, increased blood pressure, difficulty with breathing, Rash      Tree Nut Rash       Patient Active Problem List   Diagnosis     Skin cancer     Crohn's disease     Flushing     Squamous cell carcinoma of skin     Peptic ulcer     Hypertension       No past medical history on file.    Past Surgical History:   Procedure Laterality Date     partial colectomy         Social History     Social History     Marital status:      Spouse name: N/A     Number of children: N/A     Years of education: N/A     Occupational History     Not on file.     Social History Main Topics     Smoking status: Former Smoker     Smokeless tobacco: Never Used      Comment: Quit 20 yrs ago.      Alcohol use Not on file     Drug use: Not on file     Sexual activity: Not on file     Other Topics Concern     Not on file     Social History Narrative       Patient Care Team:  Nitish Alvarado MD as PCP - General (Family Medicine)          Objective:     Vitals:    01/19/18 1435 01/19/18 1520   BP: 158/90 (!) 160/92   Pulse: 76    Resp: 18    Temp: 97.9  F (36.6  C)    TempSrc: Oral    Weight: 130 lb 11.2 oz (59.3 kg)    Height: 5' 4.5\" (1.638 m)          Physical Exam:  General appearance: alert, appears stated age and cooperative  Head: Normocephalic, without obvious abnormality, atraumatic  Eyes: " conjunctivae/corneas clear. PERRL, EOM's intact.   Ears: normal TM's and external ear canals both ears  Nose: Nares normal. Septum midline. Mucosa normal. No drainage or sinus tenderness.  Throat: lips, mucosa, and tongue normal; teeth and gums normal  Neck: no adenopathy, supple, symmetrical, trachea midline and thyroid not enlarged  Back: symmetric, no curvature. ROM normal. No CVA tenderness.  Lungs: clear to auscultation bilaterally  Heart: regular rate and rhythm, S1, S2 normal, no murmur, click, rub or gallop  Abdomen: soft, non-tender; bowel sounds normal; no masses,  no organomegaly  Extremities: extremities normal, atraumatic, no cyanosis or edema  Skin: Skin color, texture, turgor normal. No rashes or lesions  Lymph nodes: Cervical nodes normal.  Neurologic: Alert and oriented X 3. Normal coordination and gait      There are no Patient Instructions on file for this visit.    EKG:      Labs:  No results found for this or any previous visit (from the past 168 hour(s)).    Immunization History   Administered Date(s) Administered     Influenza,seasonal,intradermal PF IIV3 11/22/2011     Tdap 06/20/2013       Radiology:    CT Abdomen Pelvis With Oral With IV Contrast (Order [id number])   Imaging   Date: 1/2/2018 Department: [facility] CT Released By: Nirmala Holt Authorizing: Nitish Alvarado MD   Study Result   CT ABDOMEN AND PELVIS WITH IV CONTRAST  1/2/2018 5:34 PM      INDICATION: Abdominal pain, Crohn's disease, abdominal cramps, flushing.  TECHNIQUE: CT abdomen and pelvis. Multiplanar reformation images (MPR). Dose reduction techniques were used.   IV CONTRAST: Iohexol (Omni) 100 mL.  COMPARISON: None available found in any of our hospital systems.     FINDINGS:  LUNG BASES: Negative.     ABDOMEN: Bowel loops are normal with nothing to suggest active inflammatory change. Mild diverticulosis in the sigmoid colon but nothing for diverticulitis.     Multi loculated small cystic mass in the  tail of the pancreas measures 2.5 x 2.2 cm. This abuts the main pancreatic duct which is minimally distended measuring 3 mm. Appearance most suggestive of a side branch IPMN. No bile duct dilatation but there is   air in the bile ducts likely from previous sphincterotomy. Gallbladder surgically removed.     Normal liver, spleen, kidneys, and adrenal glands other than a few small cysts in each kidney. No lymphadenopathy.     PELVIS: Negative other than postop hysterectomy.     MUSCULOSKELETAL: No concerning bone lesions. Possible posterior fusion lower lumbar spine.     IMPRESSION:   CONCLUSION:  1.  Nothing for active inflammatory disease in the bowel.     2.  No abscesses, fistulas, or obstruction.     3.  Focal 2.5 cm multi septated cystic lesion tail of the pancreas. Appearance most suggestive of a cystic pancreatic tumor such as IPMN. I was unable to find any previous for comparison. Recommend MRI for better characterization.         Electronically signed by Nitish Alvarado MD 01/19/18 3:25 PM

## 2021-06-16 PROBLEM — K50.90 CROHN'S DISEASE (H): Status: ACTIVE | Noted: 2018-01-02

## 2021-06-16 PROBLEM — C44.90 SKIN CANCER: Status: ACTIVE | Noted: 2018-01-02

## 2021-06-16 PROBLEM — R23.2 FLUSHING: Status: ACTIVE | Noted: 2018-01-02

## 2021-06-16 PROBLEM — I10 HYPERTENSION: Status: ACTIVE | Noted: 2018-01-19

## 2021-06-16 PROBLEM — C44.92 SQUAMOUS CELL CARCINOMA OF SKIN: Status: ACTIVE | Noted: 2018-01-03

## 2021-06-20 NOTE — PROGRESS NOTES
Assessment/ Plan     1. Contact cheilitis  I suspect patient has contact colitis from recurrent lip licking and possible lip palms that she has been using.  Recommend that she stopped using any lip palms and just use either coconut oil or plain Vaseline.  I do think there may be a fungal components will have her try some antifungal cream such as Lotrimin ultra Lamisil twice daily for the next week.  Due to her multiple allergies, would not rule out allergic colitis.  She will let us know if things are not improving over the next several weeks.  If not, would send her to allergy for possible patch testing.      Subjective:       Joselyn James is a 57 y.o. female who presents for evaluation of swollen lips.  Patient states that her lips have been really bad for the last 2 months or so.  She admits that she frequently is looking her lips and that this is a nervous habit.  She has been using a lip balm, most specifically EOS brand that is colored and scented.  She states that it got really bad over the weekend and admits she was using the brand Sun Bum which had a sunscreen in it.  She was outside all weekend.  She states each morning she would wake up and her lips would be really swollen.  This usually got better with applying some ice.  This morning they were so bad they were almost weeping.  She was using an antibiotic on her face for acne but stopped using it about 2 weeks ago because she thought that may be contributing.  She does have a lot of allergies listed on her allergies list.  She states that she is very sensitive to things.  She denies using any new skin care or hair products.  She has not eaten any new foods.  Does not seem to be worse after eating.  Just yesterday she stopped using the lip balm and has been using just plain coconut oil.  She has not felt any tongue swelling or difficulty swallowing.  She has not had any wheezing.  She has had no new medications and nothing over-the-counter.  She has  "never seen an allergist.  She does have a history of Crohn's disease for which she takes sulfasalazine.  She has not noticed any ulcerations in her mouth.    Relevant past medical, family, surgical, and social history reviewed with patient, unless noted in HPI, not pertinent for this visit.    Review of Systems   A 12 point comprehensive review of systems was negative except as noted.      Current Outpatient Prescriptions   Medication Sig Dispense Refill     EPINEPHrine (EPIPEN) 0.3 mg/0.3 mL atIn Inject 0.3 mg into the shoulder, thigh, or buttocks as needed.        metoprolol succinate (TOPROL-XL) 25 MG TAKE ONE TABLET BY MOUTH EVERY DAY 30 tablet 9     omeprazole (PRILOSEC) 20 MG capsule Take 20 mg by mouth daily before breakfast.        sulfaSALAzine (AZULFIDINE) 500 mg tablet Take 1,000 mg by mouth every morning.        No current facility-administered medications for this visit.        Objective:      /80  Pulse 80  Temp 98.5  F (36.9  C) (Oral)   Resp 16  Ht 5' 4.5\" (1.638 m)  Wt 134 lb (60.8 kg)  BMI 22.65 kg/m2      General appearance: alert, appears stated age and cooperative  Head: Normocephalic, without obvious abnormality, atraumatic  Eyes: conjunctivae/corneas clear.  Ears: normal TM's and external ear canals both ears  Nose: Nares normal. Septum midline. Mucosa normal. No drainage or sinus tenderness.  Throat: Oropharynx is normal, no lesions or ulcerations in the mouth.  Lips are very erythematous, there is no breaks in the skin or crusts.  Neck: no adenopathy.  Lungs: clear to auscultation bilaterally  Heart: regular rate and rhythm, S1, S2 normal, no murmur, click, rub or gallop      No results found for this or any previous visit (from the past 168 hour(s)).       This note has been dictated using voice recognition software. Any grammatical or context distortions are unintentional and inherent to the software  "

## 2021-06-22 NOTE — PROGRESS NOTES
Assessment/ Plan     1. Routine general medical examination at a health care facility    Recommend adequate calcium 1200 mg plus vitamin D 1000 units daily  Recommend weightbearing exercise    Mammogram is up-to-date from 2018    Continue to follow-up with her OB/GYN as needed      2. Screen for colon cancer    Refer for colonoscopy  - Ambulatory referral for Colonoscopy    3. Hypertension    Continue metoprolol  - metoprolol succinate (TOPROL-XL) 25 MG; Take 1 tablet (25 mg total) by mouth daily.  Dispense: 90 tablet; Refill: 3  - Basic Metabolic Panel  - Hepatic Profile  - HM2(CBC w/o Differential)  - Lipid Cascade    4. Crohn's disease without complication, unspecified gastrointestinal tract location (H)    Continue plan per gastroenterology  Continue sulfasalazine      5. Squamous cell carcinoma of skin    Dermatology follow-up recommended    6.  Bilateral shoulder pain  Possible mild rotator cuff tendinitis    Recommend conservative therapy initially  Consider referral to physical therapy or possible referral to orthopedics if needed    Patient verbalized understanding and agrees to the above plan    Subjective:       Joselyn James is a 58 y.o. female who presents for a complete physical examination.  Her medical history is notable for Crohn's colitis with previous partial colectomy.  She continues to follow-up with gastroenterology.  In the past she did have an evaluation which showed a cystic lesion in the pancreas of uncertain significance.  She did have an MRI as directed by gastroenterology and this showed cystic lesions.  At one point there was concern about possible IPMN and this will be monitored.  Her Crohn's disease has been stable.  She is treated with sulfasalazine.    Medical history is otherwise notable for previous hysterectomy.  She has followed up with her OB/GYN.  She did have a colonoscopy this past year as well.    Her blood pressure has been elevated and she continues to take metoprolol  "which she tolerates.    She does experience intermittent shoulder pain.  This can be worse when elevating her arms above her head.  She cannot think of any specific injury.  This has not been severe.    Medical history is also notable for skin cancer.  She has followed up with dermatology in the past.    She reports that overall she has been feeling well.  Review of systems is negative for headache, dizziness, chest pain, shortness breath, palpitations, bowel changes, or urinary concerns.  Her mood has been stable.    The following portions of the patient's history were reviewed and updated as appropriate: allergies, current medications, past family history, past medical history, past social history, past surgical history and problem list. Medications have been reconciled    Review of Systems   A 12 point comprehensive review of systems was negative except as noted.      Current Outpatient Medications   Medication Sig Dispense Refill     EPINEPHrine (EPIPEN) 0.3 mg/0.3 mL atIn Inject 0.3 mg into the shoulder, thigh, or buttocks as needed.        metoprolol succinate (TOPROL-XL) 25 MG Take 1 tablet (25 mg total) by mouth daily. 90 tablet 3     omeprazole (PRILOSEC) 20 MG capsule Take 1 capsule (20 mg total) by mouth daily before breakfast. 90 capsule 3     sulfaSALAzine (AZULFIDINE) 500 mg tablet Take 2 tablets (1,000 mg total) by mouth 2 (two) times a day. 360 tablet 2     No current facility-administered medications for this visit.        Objective:      /80   Pulse 72   Temp 97.9  F (36.6  C)   Resp 16   Ht 5' 4.5\" (1.638 m)   Wt 138 lb (62.6 kg)   BMI 23.32 kg/m        General appearance: alert, appears stated age and cooperative  Head: Normocephalic, without obvious abnormality, atraumatic  Eyes: conjunctivae/corneas clear. PERRL, EOM's intact.   Ears: normal TM's and external ear canals both ears  Nose: Nares normal. Septum midline. Mucosa normal. No drainage or sinus tenderness.  Throat: lips, " mucosa, and tongue normal; teeth and gums normal  Neck: no adenopathy, supple, symmetrical, trachea midline and thyroid not enlarged, symmetric, no tenderness/mass/nodules  Back: symmetric, no curvature. ROM normal. No CVA tenderness.  Lungs: clear to auscultation bilaterally  Heart: regular rate and rhythm, S1, S2 normal, no murmur, click, rub or gallop  Breast: Declined  Abdomen: soft, non-tender; bowel sounds normal; no masses,  no organomegaly  Pelvic: Declined  Extremities: extremities normal, atraumatic, no cyanosis or edema  She has good range of motion with abduction and adduction  There are no obvious impingement signs  Skin: Skin color, texture, turgor normal. No rashes or lesions  Lymph nodes: Cervical nodes normal.  Neurologic: Alert and oriented X 3         Recent Results (from the past 168 hour(s))   Basic Metabolic Panel   Result Value Ref Range    Sodium 143 136 - 145 mmol/L    Potassium 4.0 3.5 - 5.0 mmol/L    Chloride 104 98 - 107 mmol/L    CO2 31 22 - 31 mmol/L    Anion Gap, Calculation 8 5 - 18 mmol/L    Glucose 87 70 - 125 mg/dL    Calcium 9.6 8.5 - 10.5 mg/dL    BUN 12 8 - 22 mg/dL    Creatinine 0.74 0.60 - 1.10 mg/dL    GFR MDRD Af Amer >60 >60 mL/min/1.73m2    GFR MDRD Non Af Amer >60 >60 mL/min/1.73m2   Hepatic Profile   Result Value Ref Range    Bilirubin, Total 0.5 0.0 - 1.0 mg/dL    Bilirubin, Direct 0.2 <=0.5 mg/dL    Protein, Total 6.8 6.0 - 8.0 g/dL    Albumin 4.2 3.5 - 5.0 g/dL    Alkaline Phosphatase 59 45 - 120 U/L    AST 23 0 - 40 U/L    ALT 19 0 - 45 U/L   HM2(CBC w/o Differential)   Result Value Ref Range    WBC 5.0 4.0 - 11.0 thou/uL    RBC 4.41 3.80 - 5.40 mill/uL    Hemoglobin 13.3 12.0 - 16.0 g/dL    Hematocrit 39.1 35.0 - 47.0 %    MCV 89 80 - 100 fL    MCH 30.1 27.0 - 34.0 pg    MCHC 34.0 32.0 - 36.0 g/dL    RDW 10.9 (L) 11.0 - 14.5 %    Platelets 176 140 - 440 thou/uL    MPV 8.6 7.0 - 10.0 fL   Lipid Cascade   Result Value Ref Range    Cholesterol 253 (H) <=199 mg/dL     Triglycerides 84 <=149 mg/dL    HDL Cholesterol 76 >=50 mg/dL    LDL Calculated 160 (H) <=129 mg/dL    Patient Fasting > 8hrs? Yes           This note has been dictated using voice recognition software. Any grammatical or context distortions are unintentional and inherent to the software

## 2021-07-24 ENCOUNTER — HEALTH MAINTENANCE LETTER (OUTPATIENT)
Age: 61
End: 2021-07-24

## 2021-08-31 ENCOUNTER — HOSPITAL ENCOUNTER (OUTPATIENT)
Dept: MRI IMAGING | Facility: CLINIC | Age: 61
Discharge: HOME | End: 2021-08-31
Attending: INTERNAL MEDICINE | Admitting: INTERNAL MEDICINE
Payer: COMMERCIAL

## 2021-08-31 DIAGNOSIS — K86.2 PANCREAS CYST: ICD-10-CM

## 2021-08-31 PROCEDURE — 255N000002 HC RX 255 OP 636: Performed by: INTERNAL MEDICINE

## 2021-08-31 PROCEDURE — A9585 GADOBUTROL INJECTION: HCPCS | Performed by: INTERNAL MEDICINE

## 2021-08-31 PROCEDURE — 74183 MRI ABD W/O CNTR FLWD CNTR: CPT

## 2021-08-31 PROCEDURE — 258N000003 HC RX IP 258 OP 636: Performed by: INTERNAL MEDICINE

## 2021-08-31 RX ORDER — GADOBUTROL 604.72 MG/ML
6.5 INJECTION INTRAVENOUS ONCE
Status: COMPLETED | OUTPATIENT
Start: 2021-08-31 | End: 2021-08-31

## 2021-08-31 RX ADMIN — GADOBUTROL 6.5 ML: 604.72 INJECTION INTRAVENOUS at 07:39

## 2021-08-31 RX ADMIN — SODIUM CHLORIDE 50 ML: 9 INJECTION, SOLUTION INTRAVENOUS at 07:38

## 2021-09-15 ENCOUNTER — TRANSFERRED RECORDS (OUTPATIENT)
Dept: HEALTH INFORMATION MANAGEMENT | Facility: CLINIC | Age: 61
End: 2021-09-15

## 2021-09-18 ENCOUNTER — HEALTH MAINTENANCE LETTER (OUTPATIENT)
Age: 61
End: 2021-09-18

## 2021-10-05 ENCOUNTER — TELEPHONE (OUTPATIENT)
Dept: FAMILY MEDICINE | Facility: CLINIC | Age: 61
End: 2021-10-05

## 2021-10-05 DIAGNOSIS — K50.919 CROHN'S DISEASE WITH COMPLICATION, UNSPECIFIED GASTROINTESTINAL TRACT LOCATION (H): Primary | ICD-10-CM

## 2021-10-05 NOTE — TELEPHONE ENCOUNTER
Disp Refills Start End ADALGISA   sulfaSALAzine (AZULFIDINE) 500 mg tablet 360 tablet 0 1/18/2021  No   Sig - Route: Take 2 tablets (1,000 mg total) by mouth 2 (two) times a day. - Oral   Sent to pharmacy as: sulfaSALAzine 500 mg tablet (AZULFIDINE)   E-Prescribing Status: Receipt confirmed by pharmacy (1/18/2021  2:52 PM CST)     Routing refill request to provider for review/approval because:  Drug not on the [facility] refill protocol     Last Written Prescription Date:  01/18/2021  Last Fill Quantity: 360,  # refills: 0   Last office visit provider:  01/18/2021 with Dr Alvarado.     Requested Prescriptions   Pending Prescriptions Disp Refills     sulfaSALAzine (AZULFIDINE) 500 MG tablet [Pharmacy Med Name: SULFASALAZINE 500MG TABS] 360 tablet 0     Sig: TAKE TWO TABLETS BY MOUTH TWICE A DAY       There is no refill protocol information for this order          Rachel Rich 10/05/21 3:44 PM

## 2021-10-06 NOTE — TELEPHONE ENCOUNTER
Please call. I got a refill request for sulfasalazine but this medication should be managed by gastroenterology. Please ask if she has been able to follow-up with gastroenterology yet. I can give a limited refill but she needs to follow with them consistently. I have not sent the medication yet.

## 2021-10-07 RX ORDER — SULFASALAZINE 500 MG/1
TABLET ORAL
Qty: 360 TABLET | Refills: 2 | Status: SHIPPED | OUTPATIENT
Start: 2021-10-07 | End: 2021-10-11

## 2021-10-07 NOTE — TELEPHONE ENCOUNTER
Please let her know that I prescribed her medication and we can review at her next yearly check. I am ok filling her prescription for but will want her to review her history of Crohn's Disease with her GI doctor. They typically will recommend more frequent cancer screenings given her history.

## 2021-10-07 NOTE — TELEPHONE ENCOUNTER
Spoke to pt and she stated that yes she follows with GI but only sees them once every 1-2 years to check on her pancreatic cysts. Pt stated that she has been on this medication for over 10 years and has had no issues with her crones in the last 10 years. Pt stated that her primary with [facility] was prescribing this for her previously. She is wondering if you can continue to prescribe for her or if it has to go through GI?

## 2022-01-14 ENCOUNTER — OFFICE VISIT (OUTPATIENT)
Dept: FAMILY MEDICINE | Facility: CLINIC | Age: 62
End: 2022-01-14
Payer: COMMERCIAL

## 2022-01-14 VITALS
HEIGHT: 65 IN | WEIGHT: 149 LBS | BODY MASS INDEX: 24.83 KG/M2 | HEART RATE: 118 BPM | SYSTOLIC BLOOD PRESSURE: 144 MMHG | DIASTOLIC BLOOD PRESSURE: 86 MMHG | TEMPERATURE: 98.7 F | OXYGEN SATURATION: 98 %

## 2022-01-14 DIAGNOSIS — K50.919 CROHN'S DISEASE WITH COMPLICATION, UNSPECIFIED GASTROINTESTINAL TRACT LOCATION (H): ICD-10-CM

## 2022-01-14 DIAGNOSIS — I10 HYPERTENSION, UNSPECIFIED TYPE: ICD-10-CM

## 2022-01-14 DIAGNOSIS — Z00.00 ROUTINE GENERAL MEDICAL EXAMINATION AT A HEALTH CARE FACILITY: Primary | ICD-10-CM

## 2022-01-14 DIAGNOSIS — Z12.31 ENCOUNTER FOR SCREENING MAMMOGRAM FOR BREAST CANCER: ICD-10-CM

## 2022-01-14 LAB
ALBUMIN SERPL-MCNC: 3.8 G/DL (ref 3.4–5)
ALP SERPL-CCNC: 86 U/L (ref 40–150)
ALT SERPL W P-5'-P-CCNC: 25 U/L (ref 0–50)
ANION GAP SERPL CALCULATED.3IONS-SCNC: 6 MMOL/L (ref 3–14)
AST SERPL W P-5'-P-CCNC: 13 U/L (ref 0–45)
BASOPHILS # BLD AUTO: 0 10E3/UL (ref 0–0.2)
BASOPHILS NFR BLD AUTO: 1 %
BILIRUB SERPL-MCNC: 0.3 MG/DL (ref 0.2–1.3)
BUN SERPL-MCNC: 16 MG/DL (ref 7–30)
CALCIUM SERPL-MCNC: 9.1 MG/DL (ref 8.5–10.1)
CHLORIDE BLD-SCNC: 106 MMOL/L (ref 94–109)
CHOLEST SERPL-MCNC: 244 MG/DL
CO2 SERPL-SCNC: 28 MMOL/L (ref 20–32)
CREAT SERPL-MCNC: 0.65 MG/DL (ref 0.52–1.04)
EOSINOPHIL # BLD AUTO: 0.2 10E3/UL (ref 0–0.7)
EOSINOPHIL NFR BLD AUTO: 3 %
ERYTHROCYTE [DISTWIDTH] IN BLOOD BY AUTOMATED COUNT: 12.6 % (ref 10–15)
FASTING STATUS PATIENT QL REPORTED: NO
GFR SERPL CREATININE-BSD FRML MDRD: >90 ML/MIN/1.73M2
GLUCOSE BLD-MCNC: 89 MG/DL (ref 70–99)
HCT VFR BLD AUTO: 39.6 % (ref 35–47)
HDLC SERPL-MCNC: 74 MG/DL
HGB BLD-MCNC: 13.3 G/DL (ref 11.7–15.7)
LDLC SERPL CALC-MCNC: 157 MG/DL
LYMPHOCYTES # BLD AUTO: 1.6 10E3/UL (ref 0.8–5.3)
LYMPHOCYTES NFR BLD AUTO: 25 %
MCH RBC QN AUTO: 28.3 PG (ref 26.5–33)
MCHC RBC AUTO-ENTMCNC: 33.6 G/DL (ref 31.5–36.5)
MCV RBC AUTO: 84 FL (ref 78–100)
MONOCYTES # BLD AUTO: 0.6 10E3/UL (ref 0–1.3)
MONOCYTES NFR BLD AUTO: 9 %
NEUTROPHILS # BLD AUTO: 4.1 10E3/UL (ref 1.6–8.3)
NEUTROPHILS NFR BLD AUTO: 63 %
NONHDLC SERPL-MCNC: 170 MG/DL
PLATELET # BLD AUTO: 198 10E3/UL (ref 150–450)
POTASSIUM BLD-SCNC: 3.7 MMOL/L (ref 3.4–5.3)
PROT SERPL-MCNC: 7.7 G/DL (ref 6.8–8.8)
RBC # BLD AUTO: 4.7 10E6/UL (ref 3.8–5.2)
SODIUM SERPL-SCNC: 140 MMOL/L (ref 133–144)
TRIGL SERPL-MCNC: 67 MG/DL
WBC # BLD AUTO: 6.5 10E3/UL (ref 4–11)

## 2022-01-14 PROCEDURE — 36415 COLL VENOUS BLD VENIPUNCTURE: CPT | Performed by: PHYSICIAN ASSISTANT

## 2022-01-14 PROCEDURE — 80061 LIPID PANEL: CPT | Performed by: PHYSICIAN ASSISTANT

## 2022-01-14 PROCEDURE — 99396 PREV VISIT EST AGE 40-64: CPT | Performed by: PHYSICIAN ASSISTANT

## 2022-01-14 PROCEDURE — 80053 COMPREHEN METABOLIC PANEL: CPT | Performed by: PHYSICIAN ASSISTANT

## 2022-01-14 PROCEDURE — 85025 COMPLETE CBC W/AUTO DIFF WBC: CPT | Performed by: PHYSICIAN ASSISTANT

## 2022-01-14 RX ORDER — METOPROLOL SUCCINATE 25 MG/1
25 TABLET, EXTENDED RELEASE ORAL DAILY
Qty: 90 TABLET | Refills: 3 | Status: SHIPPED | OUTPATIENT
Start: 2022-01-14 | End: 2023-01-23

## 2022-01-14 RX ORDER — SULFASALAZINE 500 MG/1
TABLET ORAL
Qty: 360 TABLET | Refills: 3 | Status: SHIPPED | OUTPATIENT
Start: 2022-01-14 | End: 2023-01-23

## 2022-01-14 RX ORDER — METOPROLOL SUCCINATE 25 MG/1
TABLET, EXTENDED RELEASE ORAL
COMMUNITY
Start: 2021-10-05 | End: 2022-01-14

## 2022-01-14 ASSESSMENT — ENCOUNTER SYMPTOMS
HEMATOCHEZIA: 0
NAUSEA: 0
JOINT SWELLING: 1
PALPITATIONS: 0
COUGH: 0
ARTHRALGIAS: 1
MYALGIAS: 0
HEADACHES: 0
ABDOMINAL PAIN: 0
PARESTHESIAS: 0
BREAST MASS: 0
CHILLS: 0
HEARTBURN: 0
NERVOUS/ANXIOUS: 0
DIARRHEA: 0
CONSTIPATION: 0
SHORTNESS OF BREATH: 0
DYSURIA: 0
SORE THROAT: 0
HEMATURIA: 0
WEAKNESS: 0
FEVER: 0
EYE PAIN: 0
FREQUENCY: 0
DIZZINESS: 0

## 2022-01-14 ASSESSMENT — PAIN SCALES - GENERAL: PAINLEVEL: NO PAIN (0)

## 2022-01-14 ASSESSMENT — MIFFLIN-ST. JEOR: SCORE: 1238.8

## 2022-01-14 NOTE — LETTER
"January 19, 2022      Joselyn James  [address] [address] [address]        Dear ,    We are writing to inform you of your test results.    Your CBC (white count, hemoglobin, platelets) and comprehensive panel (electrolytes, kidney and liver function) are normal     Your cholesterol numbers are as follows:     Your total cholesterol  should be less than 200.   Your total is 244     The total cholesterol is made up of your HDL and LDL.     The HDL is the \"good\" cholesterol and when it is high (over 60), it decreases the risk for heart attack and stroke.  When HDL is less than 40, it raises the risk for these problems.  You can raise the HDL by eating fish and by performing regular aerobic exercise (e.g. running, biking, swimming, aerobics).   Your HDL is 74     The LDL is the \"bad\" cholesterol linked to heart disease and stroke. For those who have heart disease or diabetes, their LDL should be less than 100.  For all others, the LDL should be less than 160.  You can lower this by following a low fat and low cholesterol diet.   Your LDL is 157     Your triglycerides should be less than 150.  You can lower these with a low fat diet, and for those with diabetes, by improving blood sugar control.   Your triglycerides are 67     Using these numbers we can calculate a \"risk score\" which means the potential of having a major cardiovascular event (heart attack or stroke) in the next 10 years. When this risk percentage is above 7% we discuss starting cholesterol lowering medications. Your risk score is 5.3% so below treatment level but close enough that we do need to keep checking this yearly and work on lifestyle changes or maintenance to keep it low.     If you have more questions about these, please feel free to reach out to me and we can discuss further.    Resulted Orders   Comprehensive metabolic panel (BMP + Alb, Alk Phos, ALT, AST, Total. Bili, TP)   Result Value Ref Range    Sodium 140 " 133 - 144 mmol/L    Potassium 3.7 3.4 - 5.3 mmol/L    Chloride 106 94 - 109 mmol/L    Carbon Dioxide (CO2) 28 20 - 32 mmol/L    Anion Gap 6 3 - 14 mmol/L    Urea Nitrogen 16 7 - 30 mg/dL    Creatinine 0.65 0.52 - 1.04 mg/dL    Calcium 9.1 8.5 - 10.1 mg/dL    Glucose 89 70 - 99 mg/dL    Alkaline Phosphatase 86 40 - 150 U/L    AST 13 0 - 45 U/L    ALT 25 0 - 50 U/L    Protein Total 7.7 6.8 - 8.8 g/dL    Albumin 3.8 3.4 - 5.0 g/dL    Bilirubin Total 0.3 0.2 - 1.3 mg/dL    GFR Estimate >90 >60 mL/min/1.73m2      Comment:      Effective December 21, 2021 eGFRcr in adults is calculated using the 2021 CKD-EPI creatinine equation which includes age and gender (Destiny barrera al., [place], DOI: 10.1056/ABGCyw9730335)   Lipid panel reflex to direct LDL Fasting   Result Value Ref Range    Cholesterol 244 (H) <200 mg/dL    Triglycerides 67 <150 mg/dL    Direct Measure HDL 74 >=50 mg/dL    LDL Cholesterol Calculated 157 (H) <=100 mg/dL    Non HDL Cholesterol 170 (H) <130 mg/dL    Patient Fasting > 8hrs? No     Narrative    Cholesterol  Desirable:  <200 mg/dL    Triglycerides  Normal:  Less than 150 mg/dL  Borderline High:  150-199 mg/dL  High:  200-499 mg/dL  Very High:  Greater than or equal to 500 mg/dL    Direct Measure HDL  Female:  Greater than or equal to 50 mg/dL   Male:  Greater than or equal to 40 mg/dL    LDL Cholesterol  Desirable:  <100mg/dL  Above Desirable:  100-129 mg/dL   Borderline High:  130-159 mg/dL   High:  160-189 mg/dL   Very High:  >= 190 mg/dL    Non HDL Cholesterol  Desirable:  130 mg/dL  Above Desirable:  130-159 mg/dL  Borderline High:  160-189 mg/dL  High:  190-219 mg/dL  Very High:  Greater than or equal to 220 mg/dL   CBC with platelets and differential   Result Value Ref Range    WBC Count 6.5 4.0 - 11.0 10e3/uL    RBC Count 4.70 3.80 - 5.20 10e6/uL    Hemoglobin 13.3 11.7 - 15.7 g/dL    Hematocrit 39.6 35.0 - 47.0 %    MCV 84 78 - 100 fL    MCH 28.3 26.5 - 33.0 pg    MCHC 33.6 31.5 - 36.5 g/dL    RDW 12.6  10.0 - 15.0 %    Platelet Count 198 150 - 450 10e3/uL    % Neutrophils 63 %    % Lymphocytes 25 %    % Monocytes 9 %    % Eosinophils 3 %    % Basophils 1 %    Absolute Neutrophils 4.1 1.6 - 8.3 10e3/uL    Absolute Lymphocytes 1.6 0.8 - 5.3 10e3/uL    Absolute Monocytes 0.6 0.0 - 1.3 10e3/uL    Absolute Eosinophils 0.2 0.0 - 0.7 10e3/uL    Absolute Basophils 0.0 0.0 - 0.2 10e3/uL       If you have any questions or concerns, please call the clinic at the number listed above.       Sincerely,      Anju Hamlin PA-C/chante

## 2022-02-27 ENCOUNTER — HEALTH MAINTENANCE LETTER (OUTPATIENT)
Age: 62
End: 2022-02-27

## 2022-06-13 ENCOUNTER — TELEPHONE (OUTPATIENT)
Dept: FAMILY MEDICINE | Facility: CLINIC | Age: 62
End: 2022-06-13

## 2022-06-13 NOTE — TELEPHONE ENCOUNTER
Patient Quality Outreach    Patient is due for the following:   Breast Cancer Screening - Mammogram    NEXT STEPS:   Schedule a Mammogram     Type of outreach:    Sent Mimub message.      Questions for provider review:    None     Colt Kaye

## 2022-09-14 ENCOUNTER — HOSPITAL ENCOUNTER (OUTPATIENT)
Dept: OUTPATIENT PROCEDURES | Facility: CLINIC | Age: 62
Discharge: HOME | End: 2022-09-14
Attending: OPHTHALMOLOGY | Admitting: OPHTHALMOLOGY
Payer: COMMERCIAL

## 2022-09-14 PROCEDURE — 66761 REVISION OF IRIS: CPT | Mod: RT

## 2022-09-21 DIAGNOSIS — R21 RASH: Primary | ICD-10-CM

## 2022-09-23 RX ORDER — CLINDAMYCIN PHOSPHATE 10 MG/G
GEL TOPICAL
Qty: 60 G | Refills: 6 | Status: SHIPPED | OUTPATIENT
Start: 2022-09-23 | End: 2023-01-23

## 2022-09-23 NOTE — TELEPHONE ENCOUNTER
"Routing refill request to provider for review/approval because:  Drug not active on patient's medication list    Last Written Prescription Date:  10/22/21  Last Fill Quantity: 60g,  # refills:  5  Last office visit provider:   1/14/22    Requested Prescriptions   Pending Prescriptions Disp Refills     clindamycin (CLINDAMAX) 1 % external gel [Pharmacy Med Name: CLINDAMYCIN PHOSPHATE 1% GEL] 60 g 6     Sig: APPLY TOPICALLY 2 (TWO) TIMES A DAY.       Topical Acne Medications Protocol Failed - 9/21/2022  4:03 PM        Failed - Medication is active on med list        Passed - Patient is 12 years of age or older        Passed - Recent (12 mo) or future (30 days) visit within the authorizing provider's specialty     Patient has had an office visit with the authorizing provider or a provider within the authorizing providers department within the previous 12 mos or has a future within next 30 days. See \"Patient Info\" tab in inbasket, or \"Choose Columns\" in Meds & Orders section of the refill encounter.                   Ursula Fernando RN 09/22/22 11:06 PM  "

## 2022-09-28 ENCOUNTER — HOSPITAL ENCOUNTER (OUTPATIENT)
Dept: OUTPATIENT PROCEDURES | Facility: CLINIC | Age: 62
Discharge: HOME | End: 2022-09-28
Attending: OPHTHALMOLOGY | Admitting: OPHTHALMOLOGY
Payer: COMMERCIAL

## 2022-09-28 PROCEDURE — 66761 REVISION OF IRIS: CPT | Mod: LT

## 2022-10-18 ENCOUNTER — OFFICE VISIT (OUTPATIENT)
Dept: FAMILY MEDICINE | Facility: CLINIC | Age: 62
End: 2022-10-18
Payer: COMMERCIAL

## 2022-10-18 VITALS
DIASTOLIC BLOOD PRESSURE: 72 MMHG | OXYGEN SATURATION: 99 % | WEIGHT: 149 LBS | HEIGHT: 65 IN | SYSTOLIC BLOOD PRESSURE: 120 MMHG | BODY MASS INDEX: 24.83 KG/M2 | HEART RATE: 104 BPM | TEMPERATURE: 98.1 F

## 2022-10-18 DIAGNOSIS — L82.1 SEBORRHEIC KERATOSIS: Primary | ICD-10-CM

## 2022-10-18 PROCEDURE — 99207 PR NO CHARGE LOS: CPT | Performed by: PHYSICIAN ASSISTANT

## 2022-10-18 PROCEDURE — 17110 DESTRUCTION B9 LES UP TO 14: CPT | Performed by: PHYSICIAN ASSISTANT

## 2022-10-18 NOTE — PROGRESS NOTES
"  Assessment & Plan     (L82.1) Seborrheic keratosis  (primary encounter diagnosis)  Comment: benign appearing lesion on chest - getting irritated easily given location so patient would like removed. Discussed treatment with liquid nitrogen and the risks/benefits. Patient elected to proceed/consent was obtained. Using liquid nitrogen the lesion was frozen in 3 consecutive freeze/thaw cycles. Patient instructed on after care and advised to return if lesion persists or returns. Given history of skin cancer would want to monitor any area for regrowth or changes  Plan: DESTRUCT BENIGN LESION, UP TO 14            BMI:   Estimated body mass index is 25.18 kg/m  as calculated from the following:    Height as of this encounter: 1.638 m (5' 4.5\").    Weight as of this encounter: 67.6 kg (149 lb).     Return in about 4 weeks (around 11/15/2022) for If not improving or worsening.    Anju Hamlin PA-C  [facility]A Alves is a 62 year old, presenting for the following health issues:  Mole      HPI     Answers for HPI/ROS submitted by the patient on 10/18/2022  What is the reason for your visit today? : mole  How many servings of fruits and vegetables do you eat daily?: 4 or more  On average, how many sweetened beverages do you drink each day (Examples: soda, juice, sweet tea, etc.  Do NOT count diet or artificially sweetened beverages)?: 0  How many minutes a day do you exercise enough to make your heart beat faster?: 20 to 29  How many days per week do you miss taking your medication?: 0        Review of Systems   Remainder of ROS obtained and found to be negative other than that which was documented above        Objective    /72   Pulse 104   Temp 98.1  F (36.7  C) (Tympanic)   Ht 1.638 m (5' 4.5\")   Wt 67.6 kg (149 lb)   SpO2 99%   BMI 25.18 kg/m    Body mass index is 25.18 kg/m .  Physical Exam   GENERAL: healthy, alert and no distress  SKIN: light flesh/tan colored lesion on " upper right side of chest with 'stuck on' appearance - scaly/rough to touch. No erythema or pigmentation on or around the lesion

## 2022-11-19 ENCOUNTER — HEALTH MAINTENANCE LETTER (OUTPATIENT)
Age: 62
End: 2022-11-19

## 2023-01-16 ENCOUNTER — OFFICE VISIT (OUTPATIENT)
Dept: FAMILY MEDICINE | Facility: CLINIC | Age: 63
End: 2023-01-16
Payer: COMMERCIAL

## 2023-01-16 VITALS
HEART RATE: 103 BPM | BODY MASS INDEX: 24.49 KG/M2 | RESPIRATION RATE: 16 BRPM | TEMPERATURE: 100.1 F | HEIGHT: 65 IN | WEIGHT: 147 LBS | DIASTOLIC BLOOD PRESSURE: 90 MMHG | OXYGEN SATURATION: 96 % | SYSTOLIC BLOOD PRESSURE: 160 MMHG

## 2023-01-16 DIAGNOSIS — Z00.00 ROUTINE GENERAL MEDICAL EXAMINATION AT A HEALTH CARE FACILITY: Primary | ICD-10-CM

## 2023-01-16 DIAGNOSIS — K50.919 CROHN'S DISEASE WITH COMPLICATION, UNSPECIFIED GASTROINTESTINAL TRACT LOCATION (H): ICD-10-CM

## 2023-01-16 DIAGNOSIS — M79.671 RIGHT FOOT PAIN: ICD-10-CM

## 2023-01-16 DIAGNOSIS — I10 HYPERTENSION, UNSPECIFIED TYPE: ICD-10-CM

## 2023-01-16 DIAGNOSIS — Z12.31 ENCOUNTER FOR SCREENING MAMMOGRAM FOR BREAST CANCER: ICD-10-CM

## 2023-01-16 DIAGNOSIS — K86.2 PANCREATIC CYST: ICD-10-CM

## 2023-01-16 PROCEDURE — 99396 PREV VISIT EST AGE 40-64: CPT | Performed by: PHYSICIAN ASSISTANT

## 2023-01-16 PROCEDURE — 99214 OFFICE O/P EST MOD 30 MIN: CPT | Mod: 25 | Performed by: PHYSICIAN ASSISTANT

## 2023-01-16 NOTE — PROGRESS NOTES
"   SUBJECTIVE:   CC: Joselyn is an 62 year old who presents for preventive health visit.   Patient has been advised of split billing requirements and indicates understanding: Yes  Healthy Habits:    Getting at least 3 servings of Calcium per day:  Yes    Bi-annual eye exam:  Yes    Dental care twice a year:  Yes    Sleep apnea or symptoms of sleep apnea:  None    Diet:  Regular (no restrictions)    Frequency of exercise:  2-3 days/week    Duration of exercise:  30-45 minutes    Taking medications regularly:  No    Barriers to taking medications:  None    Medication side effects:  None    PHQ-2 Total Score:    Additional concerns today:  Yes     Foot problem  In May an oar fell and hit her squarely across the top of the foot - had a lot of swelling and bruising initially  Continues to have and pain over the area, especially with walking or running  Has caused her to reduce or modify her normal activity  Avoids certain shoes - her cowboy boots actually feel the best because they \"squeeze\" the foot    Crohns symptoms stable - takes a healthy assortment of supplements  Knows what foods to avoid and what foods seem to trigger it          Today's PHQ-2 Score:   PHQ-2 (  [employer]) 2023   Q1: Little interest or pleasure in doing things 0   Q2: Feeling down, depressed or hopeless 0   PHQ-2 Score 0   Q1: Little interest or pleasure in doing things -   Q2: Feeling down, depressed or hopeless -   PHQ-2 Score -       Have you ever done Advance Care Planning? (For example, a Health Directive, POLST, or a discussion with a medical provider or your loved ones about your wishes): No, advance care planning information given to patient to review.  Patient declined advance care planning discussion at this time.    Social History     Tobacco Use     Smoking status: Former     Types: Cigarettes     Quit date: 2001     Years since quittin.9     Smokeless tobacco: Never     Tobacco comments:     Quit 20 yrs ago.   Substance Use " Topics     Alcohol use: Yes     Comment: Alcoholic Drinks/day: occasional     If you drink alcohol do you typically have >3 drinks per day or >7 drinks per week? No    Alcohol Use 1/14/2022   Prescreen: >3 drinks/day or >7 drinks/week? No       Reviewed orders with patient.  Reviewed health maintenance and updated orders accordingly - Yes  BP Readings from Last 3 Encounters:   01/16/23 (!) 160/90   10/18/22 120/72   01/14/22 (!) 144/86    Wt Readings from Last 3 Encounters:   01/16/23 66.7 kg (147 lb)   10/18/22 67.6 kg (149 lb)   01/14/22 67.6 kg (149 lb)                    Breast Cancer Screening:    Breast CA Risk Assessment ([facility]-7) 1/14/2022   Do you have a family history of breast, colon, or ovarian cancer? No / Unknown           Pertinent mammograms are reviewed under the imaging tab.    History of abnormal Pap smear: Status post benign hysterectomy. Health Maintenance and Surgical History updated.     Reviewed and updated as needed this visit by clinical staff    Allergies  Meds              Reviewed and updated as needed this visit by Provider                     Review of Systems  CONSTITUTIONAL: NEGATIVE for fever, chills, change in weight  INTEGUMENTARU/SKIN: NEGATIVE for worrisome rashes, moles or lesions  EYES: NEGATIVE for vision changes or irritation  ENT: NEGATIVE for ear, mouth and throat problems  RESP: NEGATIVE for significant cough or SOB  BREAST: NEGATIVE for masses, tenderness or discharge  CV: NEGATIVE for chest pain, palpitations or peripheral edema  GI: NEGATIVE for nausea, abdominal pain, heartburn, or change in bowel habits  : NEGATIVE for unusual urinary or vaginal symptoms. Periods are regular.  MUSCULOSKELETAL: NEGATIVE for significant arthralgias or myalgia  NEURO: NEGATIVE for weakness, dizziness or paresthesias  PSYCHIATRIC: NEGATIVE for changes in mood or affect     OBJECTIVE:   BP (!) 160/90 (BP Location: Right arm, Patient Position: Sitting, Cuff Size: Adult Large)   Pulse  "103   Temp 100.1  F (37.8  C) (Tympanic)   Resp 16   Ht 1.638 m (5' 4.5\")   Wt 66.7 kg (147 lb)   SpO2 96%   BMI 24.84 kg/m    Physical Exam  GENERAL APPEARANCE: healthy, alert and no distress  EYES: Eyes grossly normal to inspection, PERRL and conjunctivae and sclerae normal  HENT: ear canals and TM's normal, nose and mouth without ulcers or lesions, oropharynx clear and oral mucous membranes moist  NECK: no adenopathy, no asymmetry, masses, or scars and thyroid normal to palpation  RESP: lungs clear to auscultation - no rales, rhonchi or wheezes  CV: regular rate and rhythm, normal S1 S2, no S3 or S4, no murmur, click or rub, no peripheral edema and peripheral pulses strong  ABDOMEN: soft, nontender, no hepatosplenomegaly, no masses and bowel sounds normal  MS: no musculoskeletal defects are noted and gait is age appropriate without ataxia  SKIN: no suspicious lesions or rashes  NEURO: Normal strength and tone, sensory exam grossly normal, mentation intact and speech normal  PSYCH: mentation appears normal and affect normal/bright    Diagnostic Test Results:  none     ASSESSMENT/PLAN:     (Z00.00) Routine general medical examination at a health care facility  (primary encounter diagnosis)  Comment:   Plan: MA SCREENING DIGITAL BILAT - Future  (s+30)            (Z12.31) Encounter for screening mammogram for breast cancer  Comment:   Plan: MA SCREENING DIGITAL BILAT - Future  (s+30)            (K50.919) Crohn's disease with complication, unspecified gastrointestinal tract location (H)  Comment:   Plan: stable. Continue medications and supplements    (K86.2) Pancreatic cyst  Comment: last imaging in 8/2021. Per GI notes was to follow up yearly. Per last image, cyst was stable in size.   Plan: MR Abdomen MRCP w/o & w Contrast            (I10) Hypertension, unspecified type  Comment: elevated today in clinic - patient here with her mom and admits stress has been high  Plan: keep an eye on blood pressure outside " "of clinic. Discussed goal of majority of readings being below 140/90. Cotninue metoprolol but may need to increase if readings stay elevated    (M79.671) Right foot pain  Comment: possible scarring and maybe some residual nerve damage? Metatarsalgia?   Plan: try compression socks as she notes that seems to help with symptoms. Also suggested trial of metatarsalgia pads. If pain persists, especially because it is already forcing her to change her activity, would consider additional imaging +/- referral to foot specialist        Patient has been advised of split billing requirements and indicates understanding: Yes      COUNSELING:  Reviewed preventive health counseling, as reflected in patient instructions      BMI:   Estimated body mass index is 24.84 kg/m  as calculated from the following:    Height as of this encounter: 1.638 m (5' 4.5\").    Weight as of this encounter: 66.7 kg (147 lb).         She reports that she quit smoking about 21 years ago. Her smoking use included cigarettes. She has never used smokeless tobacco.      Anju Hamlin PA-C  [facility]  "

## 2023-01-21 ENCOUNTER — MYC MEDICAL ADVICE (OUTPATIENT)
Dept: FAMILY MEDICINE | Facility: CLINIC | Age: 63
End: 2023-01-21
Payer: COMMERCIAL

## 2023-01-21 DIAGNOSIS — Z00.00 ROUTINE GENERAL MEDICAL EXAMINATION AT A HEALTH CARE FACILITY: ICD-10-CM

## 2023-01-21 DIAGNOSIS — R21 RASH: ICD-10-CM

## 2023-01-21 DIAGNOSIS — I10 HYPERTENSION, UNSPECIFIED TYPE: ICD-10-CM

## 2023-01-21 DIAGNOSIS — K50.919 CROHN'S DISEASE WITH COMPLICATION, UNSPECIFIED GASTROINTESTINAL TRACT LOCATION (H): ICD-10-CM

## 2023-01-23 RX ORDER — CLINDAMYCIN PHOSPHATE 10 MG/G
GEL TOPICAL
Qty: 60 G | Refills: 6 | Status: SHIPPED | OUTPATIENT
Start: 2023-01-23 | End: 2024-07-26

## 2023-01-23 RX ORDER — SULFASALAZINE 500 MG/1
TABLET ORAL
Qty: 360 TABLET | Refills: 3 | Status: SHIPPED | OUTPATIENT
Start: 2023-01-23 | End: 2024-01-18

## 2023-01-23 RX ORDER — METOPROLOL SUCCINATE 25 MG/1
25 TABLET, EXTENDED RELEASE ORAL DAILY
Qty: 90 TABLET | Refills: 3 | Status: SHIPPED | OUTPATIENT
Start: 2023-01-23 | End: 2024-01-18

## 2023-01-25 RX ORDER — SULFASALAZINE 500 MG/1
TABLET ORAL
Qty: 360 TABLET | Refills: 3 | OUTPATIENT
Start: 2023-01-25

## 2023-01-25 RX ORDER — METOPROLOL SUCCINATE 25 MG/1
25 TABLET, EXTENDED RELEASE ORAL DAILY
Qty: 90 TABLET | Refills: 3 | OUTPATIENT
Start: 2023-01-25

## 2023-01-25 NOTE — TELEPHONE ENCOUNTER
Routing refill request to provider for review/approval because:  Drug not on the [facility] refill protocol   Last recorded blood pressure does not meet RN protocol parameters  PCP to determine refill     Tru Ortiz RN

## 2023-06-06 ENCOUNTER — PATIENT OUTREACH (OUTPATIENT)
Dept: FAMILY MEDICINE | Facility: CLINIC | Age: 63
End: 2023-06-06
Payer: COMMERCIAL

## 2023-06-06 NOTE — LETTER
October 24, 2023      Joselyn James  [address] [facility] [address]        Dear Joselyn,       Our records show that you are due for the following:  Health Maintenance Due   Topic Date Due    COVID-19 Vaccine (1) Never done    Pneumococcal Vaccine: Pediatrics (0 to 5 Years) and At-Risk Patients (6 to 64 Years) (1 - PCV) Never done    HIV SCREENING  Never done    HEPATITIS C SCREENING  Never done    ZOSTER IMMUNIZATION (1 of 2) Never done    LUNG CANCER SCREENING  Never done    RSV VACCINE 60+ (1 - 1-dose 60+ series) Never done    MAMMO SCREENING  12/16/2021    DTAP/TDAP/TD IMMUNIZATION (2 - Td or Tdap) 06/20/2023    INFLUENZA VACCINE (1) 09/01/2023    ANNUAL REVIEW OF HM ORDERS  10/19/2023       If you would like to update any of the above immunizations you can schedule a nurse only visit at the clinic, contact our office at [phone].    For mammogram scheduling you can contact our imaging department directly at [phone].      [facility] Care Team

## 2023-06-06 NOTE — TELEPHONE ENCOUNTER
Patient Quality Outreach    Patient is due for the following:   Breast Cancer Screening - Mammogram    Next Steps:   Schedule a office visit for MAMMOGRAM    Type of outreach:    Sent Anytime DD message.      Questions for provider review:    None           Shana Holden MA  Chart routed to Care Team.

## 2023-10-24 NOTE — TELEPHONE ENCOUNTER
Patient Quality Outreach    Patient is due for the following:   Breast Cancer Screening - Mammogram      Topic Date Due    COVID-19 Vaccine (1) Never done    Pneumococcal Vaccine (1 - PCV) Never done    Zoster (Shingles) Vaccine (1 of 2) Never done    Diptheria Tetanus Pertussis (DTAP/TDAP/TD) Vaccine (2 - Td or Tdap) 06/20/2023    Flu Vaccine (1) 09/01/2023       Next Steps:   Schedule a mammogram     Type of outreach:    Sent letter. and [employer] messsage from June was never read.       Questions for provider review:    None           Shana Holden MA  Chart routed to Care Team.

## 2024-01-04 ENCOUNTER — PATIENT OUTREACH (OUTPATIENT)
Dept: CARE COORDINATION | Facility: CLINIC | Age: 64
End: 2024-01-04
Payer: COMMERCIAL

## 2024-01-18 ENCOUNTER — PATIENT OUTREACH (OUTPATIENT)
Dept: CARE COORDINATION | Facility: CLINIC | Age: 64
End: 2024-01-18
Payer: COMMERCIAL

## 2024-01-18 DIAGNOSIS — I10 HYPERTENSION, UNSPECIFIED TYPE: ICD-10-CM

## 2024-01-18 DIAGNOSIS — Z00.00 ROUTINE GENERAL MEDICAL EXAMINATION AT A HEALTH CARE FACILITY: ICD-10-CM

## 2024-01-18 DIAGNOSIS — K50.919 CROHN'S DISEASE WITH COMPLICATION, UNSPECIFIED GASTROINTESTINAL TRACT LOCATION (H): ICD-10-CM

## 2024-01-18 RX ORDER — METOPROLOL SUCCINATE 25 MG/1
25 TABLET, EXTENDED RELEASE ORAL DAILY
Qty: 90 TABLET | Refills: 3 | OUTPATIENT
Start: 2024-01-18

## 2024-01-18 RX ORDER — SULFASALAZINE 500 MG/1
TABLET ORAL
Qty: 720 TABLET | Refills: 1 | Status: SHIPPED | OUTPATIENT
Start: 2024-01-18 | End: 2024-05-02

## 2024-01-18 RX ORDER — SULFASALAZINE 500 MG/1
TABLET ORAL
Qty: 360 TABLET | Refills: 3 | OUTPATIENT
Start: 2024-01-18

## 2024-01-18 RX ORDER — METOPROLOL SUCCINATE 25 MG/1
25 TABLET, EXTENDED RELEASE ORAL DAILY
Qty: 180 TABLET | Refills: 1 | Status: SHIPPED | OUTPATIENT
Start: 2024-01-18 | End: 2024-05-02

## 2024-01-18 NOTE — TELEPHONE ENCOUNTER
Called patient and talked to them and was told how patient had previously talked to Provider, Anju Hamlin, earlier about an upcoming trip that she would be out of state taking care of family for a few months. Per provider, they said they would refill the medications and to go ahead and make the appointment when they get back.   Appointment for annual is scheduled for 5/2/24

## 2024-01-18 NOTE — TELEPHONE ENCOUNTER
Overdue for needed care. Please call to schedule annual exam due 1/23/23. Once appt is scheduled, route back to the pool .    Julie Behrendt RN

## 2024-04-07 ENCOUNTER — HEALTH MAINTENANCE LETTER (OUTPATIENT)
Age: 64
End: 2024-04-07

## 2024-05-02 ENCOUNTER — OFFICE VISIT (OUTPATIENT)
Dept: FAMILY MEDICINE | Facility: CLINIC | Age: 64
End: 2024-05-02
Payer: COMMERCIAL

## 2024-05-02 VITALS
DIASTOLIC BLOOD PRESSURE: 88 MMHG | BODY MASS INDEX: 24.16 KG/M2 | HEART RATE: 93 BPM | SYSTOLIC BLOOD PRESSURE: 142 MMHG | WEIGHT: 145 LBS | HEIGHT: 65 IN | OXYGEN SATURATION: 98 % | TEMPERATURE: 98.1 F

## 2024-05-02 DIAGNOSIS — Z12.11 SCREEN FOR COLON CANCER: ICD-10-CM

## 2024-05-02 DIAGNOSIS — Z11.4 SCREENING FOR HIV (HUMAN IMMUNODEFICIENCY VIRUS): ICD-10-CM

## 2024-05-02 DIAGNOSIS — Z11.59 NEED FOR HEPATITIS C SCREENING TEST: ICD-10-CM

## 2024-05-02 DIAGNOSIS — Z13.6 CARDIOVASCULAR SCREENING; LDL GOAL LESS THAN 160: ICD-10-CM

## 2024-05-02 DIAGNOSIS — Z12.31 VISIT FOR SCREENING MAMMOGRAM: ICD-10-CM

## 2024-05-02 DIAGNOSIS — Z00.00 ROUTINE GENERAL MEDICAL EXAMINATION AT A HEALTH CARE FACILITY: Primary | ICD-10-CM

## 2024-05-02 DIAGNOSIS — I10 HYPERTENSION, UNSPECIFIED TYPE: ICD-10-CM

## 2024-05-02 DIAGNOSIS — K50.919 CROHN'S DISEASE WITH COMPLICATION, UNSPECIFIED GASTROINTESTINAL TRACT LOCATION (H): ICD-10-CM

## 2024-05-02 DIAGNOSIS — H69.91 DYSFUNCTION OF RIGHT EUSTACHIAN TUBE: ICD-10-CM

## 2024-05-02 LAB
ALBUMIN SERPL BCG-MCNC: 4.5 G/DL (ref 3.5–5.2)
ALP SERPL-CCNC: 77 U/L (ref 40–150)
ALT SERPL W P-5'-P-CCNC: 21 U/L (ref 0–50)
ANION GAP SERPL CALCULATED.3IONS-SCNC: 10 MMOL/L (ref 7–15)
AST SERPL W P-5'-P-CCNC: 26 U/L (ref 0–45)
BILIRUB SERPL-MCNC: 0.4 MG/DL
BUN SERPL-MCNC: 19.3 MG/DL (ref 8–23)
CALCIUM SERPL-MCNC: 9.5 MG/DL (ref 8.8–10.2)
CHLORIDE SERPL-SCNC: 101 MMOL/L (ref 98–107)
CHOLEST SERPL-MCNC: 253 MG/DL
CREAT SERPL-MCNC: 0.71 MG/DL (ref 0.51–0.95)
DEPRECATED HCO3 PLAS-SCNC: 29 MMOL/L (ref 22–29)
EGFRCR SERPLBLD CKD-EPI 2021: >90 ML/MIN/1.73M2
FASTING STATUS PATIENT QL REPORTED: NO
GLUCOSE SERPL-MCNC: 95 MG/DL (ref 70–99)
HDLC SERPL-MCNC: 72 MG/DL
LDLC SERPL CALC-MCNC: 162 MG/DL
NONHDLC SERPL-MCNC: 181 MG/DL
POTASSIUM SERPL-SCNC: 4.3 MMOL/L (ref 3.4–5.3)
PROT SERPL-MCNC: 7.6 G/DL (ref 6.4–8.3)
SODIUM SERPL-SCNC: 140 MMOL/L (ref 135–145)
TRIGL SERPL-MCNC: 97 MG/DL

## 2024-05-02 PROCEDURE — 80053 COMPREHEN METABOLIC PANEL: CPT | Performed by: PHYSICIAN ASSISTANT

## 2024-05-02 PROCEDURE — 99214 OFFICE O/P EST MOD 30 MIN: CPT | Mod: 25 | Performed by: PHYSICIAN ASSISTANT

## 2024-05-02 PROCEDURE — 80061 LIPID PANEL: CPT | Performed by: PHYSICIAN ASSISTANT

## 2024-05-02 PROCEDURE — 36415 COLL VENOUS BLD VENIPUNCTURE: CPT | Performed by: PHYSICIAN ASSISTANT

## 2024-05-02 PROCEDURE — 99396 PREV VISIT EST AGE 40-64: CPT | Performed by: PHYSICIAN ASSISTANT

## 2024-05-02 RX ORDER — METOPROLOL SUCCINATE 25 MG/1
25 TABLET, EXTENDED RELEASE ORAL DAILY
Qty: 180 TABLET | Refills: 3 | Status: SHIPPED | OUTPATIENT
Start: 2024-05-02

## 2024-05-02 RX ORDER — SULFASALAZINE 500 MG/1
TABLET ORAL
Qty: 720 TABLET | Refills: 3 | Status: SHIPPED | OUTPATIENT
Start: 2024-05-02

## 2024-05-02 SDOH — HEALTH STABILITY: PHYSICAL HEALTH: ON AVERAGE, HOW MANY MINUTES DO YOU ENGAGE IN EXERCISE AT THIS LEVEL?: 20 MIN

## 2024-05-02 SDOH — HEALTH STABILITY: PHYSICAL HEALTH: ON AVERAGE, HOW MANY DAYS PER WEEK DO YOU ENGAGE IN MODERATE TO STRENUOUS EXERCISE (LIKE A BRISK WALK)?: 4 DAYS

## 2024-05-02 ASSESSMENT — SOCIAL DETERMINANTS OF HEALTH (SDOH): HOW OFTEN DO YOU GET TOGETHER WITH FRIENDS OR RELATIVES?: PATIENT DECLINED

## 2024-05-02 NOTE — PROGRESS NOTES
Preventive Care Visit  [facility] RAMA Hamlin PA-C, Family Medicine  May 2, 2024      Assessment & Plan     (Z00.00) Routine general medical examination at a health care facility  (primary encounter diagnosis)  Comment:   Plan: metoprolol succinate ER (TOPROL XL) 25 MG 24 hr        tablet, omeprazole (PRILOSEC) 20 MG DR capsule            (Z11.4) Screening for HIV (human immunodeficiency virus)  Comment:   Plan: HIV Antigen Antibody Combo            (Z11.59) Need for hepatitis C screening test  Comment:   Plan: Hepatitis C Screen Reflex to HCV RNA Quant and         Genotype            (Z12.31) Visit for screening mammogram  Comment:   Plan: MA SCREENING DIGITAL BILAT - Future  (s+30)            (Z12.11) Screen for colon cancer  Comment:   Plan: last in 2019 - was advised for q5 years. She has been feeling her GI symptoms are very stable and the last colonoscopy was the best one she has ever had so declines pursuing it again at this time    (Z13.6) CARDIOVASCULAR SCREENING; LDL GOAL LESS THAN 160  Comment:   Plan: Lipid panel reflex to direct LDL Fasting,         Comprehensive metabolic panel (BMP + Alb, Alk         Phos, ALT, AST, Total. Bili, TP)            (H69.91) Dysfunction of right eustachian tube  Comment: bilateral effusions, no wax/cerumen impaction. Trial of over the counter flonase for 2-4 weeks and referral to ENT so that if it persists she can follow up with them given it has already been present for 6+  months  Plan: Adult ENT  Referral            (I10) Hypertension, unspecified type  Comment: elevated x2 in clinic. Admits she has only been taking 1/2 dose so will have her increase to full dose  Plan: metoprolol succinate ER (TOPROL XL) 25 MG 24 hr        tablet            (K50.919) Crohn's disease with complication, unspecified gastrointestinal tract location (H)  Comment: stable   Plan: omeprazole (PRILOSEC) 20 MG DR capsule,         sulfasalazine (AZULFIDINE) 500  MG tablet                Patient has been advised of split billing requirements and indicates understanding: Yes          Counseling  Appropriate preventive services were discussed with this patient, including applicable screening as appropriate for fall prevention, nutrition, physical activity, Tobacco-use cessation, weight loss and cognition.  Checklist reviewing preventive services available has been given to the patient.  Reviewed patient's diet, addressing concerns and/or questions.           Lucero Alves is a 63 year old, presenting for the following:  Physical        5/2/2024     8:05 AM   Additional Questions   Roomed by AYAAN Gregory        Health Care Directive  Patient does not have a Health Care Directive or Living Will: Discussed advance care planning with patient; however, patient declined at this time.    HPI    -She hasn't been able to hear out of her right ear for the last 6 months.   Does not remember exact onset - was not sick at time. Denies allergy or sinus symptoms  Just right side - feels muffled, will sometimes feel the eardrum 'pop' and then it will clear for a bit  No ringing    - Increased stress with caring for her mom who has become increasingly difficult to manage. Siblings (2 sisters) are upset with her and do not live nearby ([facility])             5/2/2024   General Health   How would you rate your overall physical health? Good   Feel stress (tense, anxious, or unable to sleep) Patient declined         5/2/2024   Nutrition   Three or more servings of calcium each day? Yes   Diet: Other   If other, please elaborate: organic foods only   How many servings of fruit and vegetables per day? (!) 2-3   How many sweetened beverages each day? 0-1         5/2/2024   Exercise   Days per week of moderate/strenous exercise 4 days   Average minutes spent exercising at this level 20 min         5/2/2024   Social Factors   Frequency of gathering with friends or relatives Patient declined    Worry food won't last until get money to buy more Patient declined   Food not last or not have enough money for food? Patient declined   Do you have housing?  Patient declined   Are you worried about losing your housing? Patient declined   Lack of transportation? Patient declined   Unable to get utilities (heat,electricity)? Patient declined         2024   Fall Risk   Fallen 2 or more times in the past year? No   Trouble with walking or balance? No          2024   Dental   Dentist two times every year? Yes         2024   TB Screening   Were you born outside of the [place]? No         Today's PHQ-2 Score:       2024     8:05 AM   PHQ-2 (  [employer])   Q1: Little interest or pleasure in doing things 0   Q2: Feeling down, depressed or hopeless 0   PHQ-2 Score 0   Q1: Little interest or pleasure in doing things Not at all   Q2: Feeling down, depressed or hopeless Not at all   PHQ-2 Score 0           2024   Substance Use   Alcohol more than 3/day or more than 7/wk Not Applicable   Do you use any other substances recreationally? No     Social History     Tobacco Use    Smoking status: Former     Current packs/day: 0.00     Types: Cigarettes     Quit date: 2001     Years since quittin.2    Smokeless tobacco: Never    Tobacco comments:     Quit 20 yrs ago.   Substance Use Topics    Alcohol use: Yes     Comment: Alcoholic Drinks/day: occasional    Drug use: No           2024   Breast Cancer Screening   Family history of breast, colon, or ovarian cancer? No / Unknown      Mammogram Screening - Mammogram every 1-2 years updated in Health Maintenance based on mutual decision making          2024   One time HIV Screening   Previous HIV test? Decline         2024   STI Screening   New sexual partner(s) since last STI/HIV test? (!) DECLINE     History of abnormal Pap smear: Status post benign hysterectomy. Health Maintenance and Surgical History updated.       ASCVD Risk   The  "10-year ASCVD risk score (Abdirashid MCKEON, et al., 2019) is: 6.4%    Values used to calculate the score:      Age: 63 years      Sex: Female      Is Non- : No      Diabetic: No      Tobacco smoker: No      Systolic Blood Pressure: 142 mmHg      Is BP treated: Yes      HDL Cholesterol: 74 mg/dL      Total Cholesterol: 244 mg/dL      Reviewed and updated as needed this visit by Provider                    BP Readings from Last 3 Encounters:   05/02/24 (!) 142/88   01/16/23 (!) 160/90   10/18/22 120/72    Wt Readings from Last 3 Encounters:   05/02/24 65.8 kg (145 lb)   01/16/23 66.7 kg (147 lb)   10/18/22 67.6 kg (149 lb)                      Review of Systems  CONSTITUTIONAL: NEGATIVE for fever, chills, change in weight  INTEGUMENTARY/SKIN: NEGATIVE for worrisome rashes, moles or lesions  EYES: NEGATIVE for vision changes or irritation  ENT/MOUTH: NEGATIVE for ear, mouth and throat problems  RESP: NEGATIVE for significant cough or SOB  BREAST: NEGATIVE for masses, tenderness or discharge  CV: NEGATIVE for chest pain, palpitations or peripheral edema  GI: NEGATIVE for nausea, abdominal pain, heartburn, or change in bowel habits  : NEGATIVE for frequency, dysuria, or hematuria  MUSCULOSKELETAL: NEGATIVE for significant arthralgias or myalgia  NEURO: NEGATIVE for weakness, dizziness or paresthesias  ENDOCRINE: NEGATIVE for temperature intolerance, skin/hair changes  HEME: NEGATIVE for bleeding problems  PSYCHIATRIC: NEGATIVE for changes in mood or affect     Objective    Exam  BP (!) 142/88   Pulse 93   Temp 98.1  F (36.7  C) (Tympanic)   Ht 1.638 m (5' 4.5\")   Wt 65.8 kg (145 lb)   SpO2 98%   BMI 24.50 kg/m     Estimated body mass index is 24.5 kg/m  as calculated from the following:    Height as of this encounter: 1.638 m (5' 4.5\").    Weight as of this encounter: 65.8 kg (145 lb).    Physical Exam  GENERAL: alert and no distress  EYES: Eyes grossly normal to inspection, PERRL and " conjunctivae and sclerae normal  HENT: ear canals and TM's normal, nose and mouth without ulcers or lesions  NECK: no adenopathy, no asymmetry, masses, or scars  RESP: lungs clear to auscultation - no rales, rhonchi or wheezes  CV: regular rate and rhythm, normal S1 S2, no S3 or S4, no murmur, click or rub, no peripheral edema  ABDOMEN: soft, nontender, no hepatosplenomegaly, no masses and bowel sounds normal  MS: no gross musculoskeletal defects noted, no edema  SKIN: no suspicious lesions or rashes  NEURO: Normal strength and tone, mentation intact and speech normal  PSYCH: mentation appears normal, affect normal/bright        Signed Electronically by: Anju Hamlin PA-C

## 2024-05-02 NOTE — PATIENT INSTRUCTIONS
Preventive Care Advice   This is general advice given by our system to help you stay healthy. However, your care team may have specific advice just for you. Please talk to your care team about your preventive care needs.  Nutrition  Eat 5 or more servings of fruits and vegetables each day.  Try wheat bread, brown rice and whole grain pasta (instead of white bread, rice, and pasta).  Get enough calcium and vitamin D. Check the label on foods and aim for 100% of the RDA (recommended daily allowance).  Lifestyle  Exercise at least 150 minutes each week   (30 minutes a day, 5 days a week).  Do muscle strengthening activities 2 days a week. These help control your weight and prevent disease.  No smoking.  Wear sunscreen to prevent skin cancer.  Have a dental exam and cleaning every 6 months.  Yearly exams  See your health care team every year to talk about:  Any changes in your health.  Any medicines your care team has prescribed.  Preventive care, family planning, and ways to prevent chronic diseases.  Shots (vaccines)   HPV shots (up to age 26), if you've never had them before.  Hepatitis B shots (up to age 59), if you've never had them before.  COVID-19 shot: Get this shot when it's due.  Flu shot: Get a flu shot every year.  Tetanus shot: Get a tetanus shot every 10 years.  Pneumococcal, hepatitis A, and RSV shots: Ask your care team if you need these based on your risk.  Shingles shot (for age 50 and up).  General health tests  Diabetes screening:  Starting at age 35, Get screened for diabetes at least every 3 years.  If you are younger than age 35, ask your care team if you should be screened for diabetes.  Cholesterol test: At age 39, start having a cholesterol test every 5 years, or more often if advised.  Bone density scan (DEXA): At age 50, ask your care team if you should have this scan for osteoporosis (brittle bones).  Hepatitis C: Get tested at least once in your life.  STIs (sexually transmitted  infections)  Before age 24: Ask your care team if you should be screened for STIs.  After age 24: Get screened for STIs if you're at risk. You are at risk for STIs (including HIV) if:  You are sexually active with more than one person.  You don't use condoms every time.  You or a partner was diagnosed with a sexually transmitted infection.  If you are at risk for HIV, ask about PrEP medicine to prevent HIV.  Get tested for HIV at least once in your life, whether you are at risk for HIV or not.  Cancer screening tests  Cervical cancer screening: If you have a cervix, begin getting regular cervical cancer screening tests at age 21. Most people who have regular screenings with normal results can stop after age 65. Talk about this with your provider.  Breast cancer scan (mammogram): If you've ever had breasts, begin having regular mammograms starting at age 40. This is a scan to check for breast cancer.  Colon cancer screening: It is important to start screening for colon cancer at age 45.  Have a colonoscopy test every 10 years (or more often if you're at risk) Or, ask your provider about stool tests like a FIT test every year or Cologuard test every 3 years.  To learn more about your testing options, visit: https://[email or web address]/093098.pdf.  For help making a decision, visit: https://bit.ly/pb88864.  Prostate cancer screening test: If you have a prostate and are age 55 to 69, ask your provider if you would benefit from a yearly prostate cancer screening test.  Lung cancer screening: If you are a current or former smoker age 50 to 80, ask your care team if ongoing lung cancer screenings are right for you.  For informational purposes only. Not to replace the advice of your health care provider. Copyright   2023 [employer]. All rights reserved. Clinically reviewed by the [facility] Transitions Program. [employer] [id number] - REV 01/24.

## 2024-07-25 DIAGNOSIS — R21 RASH: ICD-10-CM

## 2024-07-26 RX ORDER — CLINDAMYCIN PHOSPHATE 10 MG/G
GEL TOPICAL
Qty: 60 G | Refills: 6 | Status: SHIPPED | OUTPATIENT
Start: 2024-07-26

## 2025-01-13 ENCOUNTER — TELEPHONE (OUTPATIENT)
Dept: FAMILY MEDICINE | Facility: CLINIC | Age: 65
End: 2025-01-13
Payer: COMMERCIAL

## 2025-02-24 ENCOUNTER — TELEPHONE (OUTPATIENT)
Dept: FAMILY MEDICINE | Facility: CLINIC | Age: 65
End: 2025-02-24
Payer: COMMERCIAL

## 2025-02-24 NOTE — TELEPHONE ENCOUNTER
Patient Quality Outreach    Patient is due for the following:   Breast Cancer Screening - Mammogram    Action(s) Taken:   Patient needs to schedule a mammogram.    Type of outreach:    Sent [employer] (ML4D) message.    Questions for provider review:    None           Colt Bradley         Moderate Yes

## 2025-06-09 ENCOUNTER — OFFICE VISIT (OUTPATIENT)
Dept: FAMILY MEDICINE | Facility: CLINIC | Age: 65
End: 2025-06-09
Payer: COMMERCIAL

## 2025-06-09 VITALS
RESPIRATION RATE: 16 BRPM | TEMPERATURE: 97.8 F | HEART RATE: 80 BPM | WEIGHT: 146 LBS | SYSTOLIC BLOOD PRESSURE: 128 MMHG | DIASTOLIC BLOOD PRESSURE: 82 MMHG | BODY MASS INDEX: 24.32 KG/M2 | OXYGEN SATURATION: 97 % | HEIGHT: 65 IN

## 2025-06-09 DIAGNOSIS — Z12.31 VISIT FOR SCREENING MAMMOGRAM: ICD-10-CM

## 2025-06-09 DIAGNOSIS — K50.10 CROHN'S DISEASE OF COLON WITHOUT COMPLICATION (H): ICD-10-CM

## 2025-06-09 DIAGNOSIS — L70.0 ACNE VULGARIS: ICD-10-CM

## 2025-06-09 DIAGNOSIS — I10 PRIMARY HYPERTENSION: ICD-10-CM

## 2025-06-09 DIAGNOSIS — Z00.00 ROUTINE GENERAL MEDICAL EXAMINATION AT A HEALTH CARE FACILITY: Primary | ICD-10-CM

## 2025-06-09 DIAGNOSIS — R21 RASH: ICD-10-CM

## 2025-06-09 DIAGNOSIS — Z12.11 SCREEN FOR COLON CANCER: ICD-10-CM

## 2025-06-09 PROBLEM — K50.90 REGIONAL ENTERITIS (H): Status: RESOLVED | Noted: 2018-01-02 | Resolved: 2025-06-09

## 2025-06-09 PROBLEM — K50.90 REGIONAL ENTERITIS (H): Status: ACTIVE | Noted: 2018-01-02

## 2025-06-09 PROBLEM — R93.5 ABNORMAL MRI OF ABDOMEN: Status: ACTIVE | Noted: 2018-01-10

## 2025-06-09 PROBLEM — K50.90 CROHN'S DISEASE (H): Status: RESOLVED | Noted: 2018-01-02 | Resolved: 2025-06-09

## 2025-06-09 PROBLEM — Z87.19 HISTORY OF CROHN'S DISEASE: Status: ACTIVE | Noted: 2018-01-10

## 2025-06-09 PROBLEM — K86.2 CYST OF PANCREAS: Status: ACTIVE | Noted: 2018-02-17

## 2025-06-09 LAB
ALBUMIN SERPL BCG-MCNC: 4.4 G/DL (ref 3.5–5.2)
ALP SERPL-CCNC: 73 U/L (ref 40–150)
ALT SERPL W P-5'-P-CCNC: 26 U/L (ref 0–50)
ANION GAP SERPL CALCULATED.3IONS-SCNC: 8 MMOL/L (ref 7–15)
AST SERPL W P-5'-P-CCNC: 30 U/L (ref 0–45)
BILIRUB SERPL-MCNC: 0.3 MG/DL
BUN SERPL-MCNC: 12.7 MG/DL (ref 8–23)
CALCIUM SERPL-MCNC: 9.4 MG/DL (ref 8.8–10.4)
CHLORIDE SERPL-SCNC: 104 MMOL/L (ref 98–107)
CHOLEST SERPL-MCNC: 239 MG/DL
CREAT SERPL-MCNC: 0.68 MG/DL (ref 0.51–0.95)
EGFRCR SERPLBLD CKD-EPI 2021: >90 ML/MIN/1.73M2
FASTING STATUS PATIENT QL REPORTED: NO
FASTING STATUS PATIENT QL REPORTED: NO
GLUCOSE SERPL-MCNC: 105 MG/DL (ref 70–99)
HCO3 SERPL-SCNC: 30 MMOL/L (ref 22–29)
HDLC SERPL-MCNC: 72 MG/DL
LDLC SERPL CALC-MCNC: 151 MG/DL
NONHDLC SERPL-MCNC: 167 MG/DL
POTASSIUM SERPL-SCNC: 4.6 MMOL/L (ref 3.4–5.3)
PROT SERPL-MCNC: 7.4 G/DL (ref 6.4–8.3)
SODIUM SERPL-SCNC: 142 MMOL/L (ref 135–145)
TRIGL SERPL-MCNC: 78 MG/DL

## 2025-06-09 PROCEDURE — 36415 COLL VENOUS BLD VENIPUNCTURE: CPT | Performed by: PHYSICIAN ASSISTANT

## 2025-06-09 PROCEDURE — 3074F SYST BP LT 130 MM HG: CPT | Performed by: PHYSICIAN ASSISTANT

## 2025-06-09 PROCEDURE — 99396 PREV VISIT EST AGE 40-64: CPT | Performed by: PHYSICIAN ASSISTANT

## 2025-06-09 PROCEDURE — 3079F DIAST BP 80-89 MM HG: CPT | Performed by: PHYSICIAN ASSISTANT

## 2025-06-09 PROCEDURE — 80061 LIPID PANEL: CPT | Performed by: PHYSICIAN ASSISTANT

## 2025-06-09 PROCEDURE — 80053 COMPREHEN METABOLIC PANEL: CPT | Performed by: PHYSICIAN ASSISTANT

## 2025-06-09 RX ORDER — CLINDAMYCIN AND BENZOYL PEROXIDE 10; 50 MG/G; MG/G
GEL TOPICAL
Qty: 50 G | Refills: 11 | Status: SHIPPED | OUTPATIENT
Start: 2025-06-09

## 2025-06-09 RX ORDER — CLINDAMYCIN PHOSPHATE 10 UG/ML
LOTION TOPICAL 2 TIMES DAILY
Qty: 60 ML | Refills: 3 | Status: SHIPPED | OUTPATIENT
Start: 2025-06-09

## 2025-06-09 RX ORDER — SULFASALAZINE 500 MG/1
TABLET ORAL
Qty: 720 TABLET | Refills: 3 | Status: SHIPPED | OUTPATIENT
Start: 2025-06-09

## 2025-06-09 RX ORDER — METOPROLOL SUCCINATE 25 MG/1
12.5 TABLET, EXTENDED RELEASE ORAL DAILY
Qty: 45 TABLET | Refills: 3 | Status: SHIPPED | OUTPATIENT
Start: 2025-06-09

## 2025-06-09 RX ORDER — OMEPRAZOLE 20 MG/1
20 CAPSULE, DELAYED RELEASE ORAL DAILY
Qty: 180 CAPSULE | Refills: 3 | Status: SHIPPED | OUTPATIENT
Start: 2025-06-09

## 2025-06-09 SDOH — HEALTH STABILITY: PHYSICAL HEALTH: ON AVERAGE, HOW MANY MINUTES DO YOU ENGAGE IN EXERCISE AT THIS LEVEL?: 30 MIN

## 2025-06-09 SDOH — HEALTH STABILITY: PHYSICAL HEALTH: ON AVERAGE, HOW MANY DAYS PER WEEK DO YOU ENGAGE IN MODERATE TO STRENUOUS EXERCISE (LIKE A BRISK WALK)?: 4 DAYS

## 2025-06-09 ASSESSMENT — SOCIAL DETERMINANTS OF HEALTH (SDOH): HOW OFTEN DO YOU GET TOGETHER WITH FRIENDS OR RELATIVES?: PATIENT DECLINED

## 2025-06-09 NOTE — PATIENT INSTRUCTIONS
Patient Education   Preventive Care Advice   This is general advice given by our system to help you stay healthy. However, your care team may have specific advice just for you. Please talk to your care team about your preventive care needs.  Nutrition  Eat 5 or more servings of fruits and vegetables each day.  Try wheat bread, brown rice and whole grain pasta (instead of white bread, rice, and pasta).  Get enough calcium and vitamin D. Check the label on foods and aim for 100% of the RDA (recommended daily allowance).  Lifestyle  Exercise at least 150 minutes each week  (30 minutes a day, 5 days a week).  Do muscle strengthening activities 2 days a week. These help control your weight and prevent disease.  No smoking.  Wear sunscreen to prevent skin cancer.  Have a dental exam and cleaning every 6 months.  Yearly exams  See your health care team every year to talk about:  Any changes in your health.  Any medicines your care team has prescribed.  Preventive care, family planning, and ways to prevent chronic diseases.  Shots (vaccines)   HPV shots (up to age 26), if you've never had them before.  Hepatitis B shots (up to age 59), if you've never had them before.  COVID-19 shot: Get this shot when it's due.  Flu shot: Get a flu shot every year.  Tetanus shot: Get a tetanus shot every 10 years.  Pneumococcal, hepatitis A, and RSV shots: Ask your care team if you need these based on your risk.  Shingles shot (for age 50 and up)  General health tests  Diabetes screening:  Starting at age 35, Get screened for diabetes at least every 3 years.  If you are younger than age 35, ask your care team if you should be screened for diabetes.  Cholesterol test: At age 39, start having a cholesterol test every 5 years, or more often if advised.  Bone density scan (DEXA): At age 50, ask your care team if you should have this scan for osteoporosis (brittle bones).  Hepatitis C: Get tested at least once in your life.  STIs (sexually  transmitted infections)  Before age 24: Ask your care team if you should be screened for STIs.  After age 24: Get screened for STIs if you're at risk. You are at risk for STIs (including HIV) if:  You are sexually active with more than one person.  You don't use condoms every time.  You or a partner was diagnosed with a sexually transmitted infection.  If you are at risk for HIV, ask about PrEP medicine to prevent HIV.  Get tested for HIV at least once in your life, whether you are at risk for HIV or not.  Cancer screening tests  Cervical cancer screening: If you have a cervix, begin getting regular cervical cancer screening tests starting at age 21.  Breast cancer scan (mammogram): If you've ever had breasts, begin having regular mammograms starting at age 40. This is a scan to check for breast cancer.  Colon cancer screening: It is important to start screening for colon cancer at age 45.  Have a colonoscopy test every 10 years (or more often if you're at risk) Or, ask your provider about stool tests like a FIT test every year or Cologuard test every 3 years.  To learn more about your testing options, visit:   .  For help making a decision, visit:   https://bit.ly/mr47281.  Prostate cancer screening test: If you have a prostate, ask your care team if a prostate cancer screening test (PSA) at age 55 is right for you.  Lung cancer screening: If you are a current or former smoker ages 50 to 80, ask your care team if ongoing lung cancer screenings are right for you.  For informational purposes only. Not to replace the advice of your health care provider. Copyright   2023 [employer]. All rights reserved. Clinically reviewed by the [facility] Transitions Program. [employer] [id number] - REV 01/24.

## 2025-06-09 NOTE — PROGRESS NOTES
Preventive Care Visit  [facility] RAMA Hamlin PA-C, Family Medicine  Jun 9, 2025      Assessment & Plan     (Z00.00) Routine general medical examination at a health care facility  (primary encounter diagnosis)  Comment: Pt presents for her yearly physical with no new complaints  Plan: Lipid panel reflex to direct LDL Fasting,         Comprehensive metabolic panel (BMP + Alb, Alk         Phos, ALT, AST, Total. Bili, TP), metoprolol         succinate ER (TOPROL XL) 25 MG 24 hr tablet,         omeprazole (PRILOSEC) 20 MG DR capsule            (Z12.31) Visit for screening mammogram  Comment: Pt declined the reccommended mammogram, pt was informed of the recommended test and its potential benefits as well as the potential risks.    Plan: Pt declined at this time     (Z12.11) Screen for colon cancer  Comment: Pt is followed by GI for Chron's disease. She will continue to follow the plan that GI has in place for her for her colonoscopy   Plan: Continue taking Sulfasalazine as prescribed.      (I10) Primary hypertension  Comment: Blood pressure within the normal range. Pt reports that she only takes half a tablet   Plan: Continue to monitor blood pressure and continue with scheduled metoprolol     (K50.10) Crohn's disease of colon without complication (H)  Comment: Pt is followed by GI for Chron's.   Plan: sulfaSALAzine (AZULFIDINE) 500 MG tablet        Continue as ordered     (L70.0) Acne vulgaris  Comment:   Plan: clindamycin-benzoyl peroxide (BENZACLIN) 1-5 %         external gel        Refilled medications    (R21) Rash  Comment:   Plan: clindamycin (CLEOCIN T) 1 % external lotion        Refilled medications           Patient has been advised of split billing requirements and indicates understanding: Yes        Counseling  Appropriate preventive services were addressed with this patient via screening, questionnaire, or discussion as appropriate for fall prevention, nutrition, physical  activity, Tobacco-use cessation, social engagement, weight loss and cognition.  Checklist reviewing preventive services available has been given to the patient.  Reviewed patient's diet, addressing concerns and/or questions.       Follow-up    Follow-up Visit   Expected date:  Jun 09, 2026 (Approximate)      Follow Up Appointment Details:     Follow-up with whom?: PCP    Follow-Up for what?: Adult Preventive    How?: In Person                 Lucero Alves is a 64 year old, presenting for the following:  Physical        6/9/2025     8:11 AM   Additional Questions   Roomed by AYAAN Gregory       Pt presents to the clinic today for a routine yearly physical. Pt declines any health concerns. Her blood pressure is within normal range today. She reports that she likes to take a half a tablet of metoprolol and then uses natural things to help as well. Pt denies any aches, pains, or concerns at this time.     Advance Care Planning    Discussed advance care planning with patient; however, patient declined at this time.        6/9/2025   General Health   How would you rate your overall physical health? Good   Feel stress (tense, anxious, or unable to sleep) Patient declined         6/9/2025   Nutrition   Three or more servings of calcium each day? Yes   Diet: Regular (no restrictions)   How many servings of fruit and vegetables per day? (!) 2-3   How many sweetened beverages each day? 0-1         6/9/2025   Exercise   Days per week of moderate/strenous exercise 4 days   Average minutes spent exercising at this level 30 min         6/9/2025   Social Factors   Frequency of gathering with friends or relatives Patient declined   Worry food won't last until get money to buy more No   Food not last or not have enough money for food? No   Do you have housing? (Housing is defined as stable permanent housing and does not include staying outside in a car, in a tent, in an abandoned building, in an overnight shelter, or  couch-surfing.) Yes   Are you worried about losing your housing? No   Lack of transportation? No   Unable to get utilities (heat,electricity)? Patient declined         2025   Fall Risk   Fallen 2 or more times in the past year? No   Trouble with walking or balance? No          2025   Dental   Dentist two times every year? Yes         Today's PHQ-2 Score:       2025     8:08 AM   PHQ-2 (  [employer])   Q1: Little interest or pleasure in doing things 0   Q2: Feeling down, depressed or hopeless 0   PHQ-2 Score 0    Q1: Little interest or pleasure in doing things Not at all   Q2: Feeling down, depressed or hopeless Not at all   PHQ-2 Score 0       Patient-reported           2025   Substance Use   Alcohol more than 3/day or more than 7/wk Not Applicable   Do you use any other substances recreationally? No     Social History     Tobacco Use    Smoking status: Former     Current packs/day: 0.00     Types: Cigarettes     Quit date: 2001     Years since quittin.3    Smokeless tobacco: Never    Tobacco comments:     Quit 20 yrs ago.   Vaping Use    Vaping status: Never Used   Substance Use Topics    Alcohol use: Yes     Comment: Alcoholic Drinks/day: occasional    Drug use: No       Mammogram Screening - Mammography discussed and declined          2025   One time HIV Screening   Previous HIV test? No         2025   STI Screening   New sexual partner(s) since last STI/HIV test? No            ASCVD Risk   The 10-year ASCVD risk score (Abdirashid MCKEON, et al., 2019) is: 6.1%    Values used to calculate the score:      Age: 64 years      Sex: Female      Is Non- : No      Diabetic: No      Tobacco smoker: No      Systolic Blood Pressure: 128 mmHg      Is BP treated: Yes      HDL Cholesterol: 72 mg/dL      Total Cholesterol: 253 mg/dL          Reviewed and updated as needed this visit by Provider                    BP Readings from Last 3 Encounters:   25 128/82  "  05/02/24 (!) 142/88   01/16/23 (!) 160/90    Wt Readings from Last 3 Encounters:   06/09/25 66.2 kg (146 lb)   05/02/24 65.8 kg (145 lb)   01/16/23 66.7 kg (147 lb)                      Review of Systems  Constitutional, HEENT, cardiovascular, pulmonary, GI, , musculoskeletal, neuro, skin, endocrine and psych systems are negative, except as otherwise noted.     Objective    Exam  /82   Pulse 80   Temp 97.8  F (36.6  C) (Tympanic)   Resp 16   Ht 1.638 m (5' 4.5\")   Wt 66.2 kg (146 lb)   SpO2 97%   BMI 24.67 kg/m     Estimated body mass index is 24.67 kg/m  as calculated from the following:    Height as of this encounter: 1.638 m (5' 4.5\").    Weight as of this encounter: 66.2 kg (146 lb).    Physical Exam  GENERAL: alert and no distress  EYES: Eyes grossly normal to inspection, PERRL and conjunctivae and sclerae normal  HENT: ear canals and TM's normal, nose and mouth without ulcers or lesions  NECK: no adenopathy, no asymmetry, masses, or scars  RESP: lungs clear to auscultation - no rales, rhonchi or wheezes  CV: regular rate and rhythm, normal S1 S2, no S3 or S4, no murmur, click or rub, no peripheral edema  ABDOMEN: soft, nontender, no hepatosplenomegaly, no masses and bowel sounds normal  MS: no gross musculoskeletal defects noted, no edema  SKIN: no suspicious lesions or rashes  NEURO: Normal strength and tone, mentation intact and speech normal  PSYCH: mentation appears normal, affect normal/bright    Kerri Dorsey NP student, on 6/9/2025 at 9:00 AM    I personally saw and evaluated Joselyn James as part of a shared visit.  I have reviewed, discussed, and am in agreement with the student as to their treatment plan as outlined in the note above.   As noted above, patient prefers to treat more naturally than with medications. Also declines screenings and immunizations    Signed Electronically by: Anju Hamlin PA-C    "

## 2025-06-11 ENCOUNTER — RESULTS FOLLOW-UP (OUTPATIENT)
Dept: FAMILY MEDICINE | Facility: CLINIC | Age: 65
End: 2025-06-11

## (undated) RX ORDER — LIDOCAINE HYDROCHLORIDE 10 MG/ML
INJECTION, SOLUTION EPIDURAL; INFILTRATION; INTRACAUDAL; PERINEURAL
Status: DISPENSED
Start: 2019-01-10

## (undated) RX ORDER — ONDANSETRON 2 MG/ML
INJECTION INTRAMUSCULAR; INTRAVENOUS
Status: DISPENSED
Start: 2019-01-10